# Patient Record
Sex: FEMALE | Race: WHITE | NOT HISPANIC OR LATINO
[De-identification: names, ages, dates, MRNs, and addresses within clinical notes are randomized per-mention and may not be internally consistent; named-entity substitution may affect disease eponyms.]

---

## 2017-03-09 ENCOUNTER — TRANSCRIPTION ENCOUNTER (OUTPATIENT)
Age: 32
End: 2017-03-09

## 2018-12-13 ENCOUNTER — TRANSCRIPTION ENCOUNTER (OUTPATIENT)
Age: 33
End: 2018-12-13

## 2018-12-31 ENCOUNTER — TRANSCRIPTION ENCOUNTER (OUTPATIENT)
Age: 33
End: 2018-12-31

## 2020-10-01 ENCOUNTER — APPOINTMENT (OUTPATIENT)
Dept: DERMATOLOGY | Facility: CLINIC | Age: 35
End: 2020-10-01

## 2021-07-20 ENCOUNTER — NON-APPOINTMENT (OUTPATIENT)
Age: 36
End: 2021-07-20

## 2021-07-20 ENCOUNTER — APPOINTMENT (OUTPATIENT)
Dept: OBGYN | Facility: CLINIC | Age: 36
End: 2021-07-20
Payer: COMMERCIAL

## 2021-07-20 ENCOUNTER — TRANSCRIPTION ENCOUNTER (OUTPATIENT)
Age: 36
End: 2021-07-20

## 2021-07-20 VITALS
WEIGHT: 134 LBS | BODY MASS INDEX: 19.18 KG/M2 | HEIGHT: 70 IN | DIASTOLIC BLOOD PRESSURE: 81 MMHG | SYSTOLIC BLOOD PRESSURE: 128 MMHG

## 2021-07-20 DIAGNOSIS — Z00.00 ENCOUNTER FOR GENERAL ADULT MEDICAL EXAMINATION W/OUT ABNORMAL FINDINGS: ICD-10-CM

## 2021-07-20 PROCEDURE — 99385 PREV VISIT NEW AGE 18-39: CPT

## 2021-07-20 PROCEDURE — 99072 ADDL SUPL MATRL&STAF TM PHE: CPT

## 2021-07-20 NOTE — HISTORY OF PRESENT ILLNESS
[FreeTextEntry1] : 35 yo p1 here for annual exam. has 8 mo old son, had nsd at 36 weeks , uncomplicated.had multiple problems w breast feeding- clogged ducts, mastitis, cyst drainages, so stopped after a month. works for [email or web address].

## 2021-07-22 LAB — HPV HIGH+LOW RISK DNA PNL CVX: NOT DETECTED

## 2021-07-28 LAB — CYTOLOGY CVX/VAG DOC THIN PREP: NORMAL

## 2022-04-01 ENCOUNTER — TRANSCRIPTION ENCOUNTER (OUTPATIENT)
Age: 37
End: 2022-04-01

## 2022-04-05 RX ORDER — LEVONORGESTREL AND ETHINYL ESTRADIOL 0.1-0.02MG
0.1-2 KIT ORAL DAILY
Qty: 3 | Refills: 0 | Status: ACTIVE | COMMUNITY
Start: 2021-09-29 | End: 1900-01-01

## 2022-04-22 ENCOUNTER — APPOINTMENT (OUTPATIENT)
Dept: ORTHOPEDIC SURGERY | Facility: CLINIC | Age: 37
End: 2022-04-22
Payer: COMMERCIAL

## 2022-04-22 VITALS
SYSTOLIC BLOOD PRESSURE: 127 MMHG | HEIGHT: 70 IN | BODY MASS INDEX: 19.33 KG/M2 | WEIGHT: 135 LBS | HEART RATE: 85 BPM | DIASTOLIC BLOOD PRESSURE: 81 MMHG

## 2022-04-22 DIAGNOSIS — Z78.9 OTHER SPECIFIED HEALTH STATUS: ICD-10-CM

## 2022-04-22 PROCEDURE — 99203 OFFICE O/P NEW LOW 30 MIN: CPT

## 2022-04-22 RX ORDER — METHYLPREDNISOLONE 4 MG/1
4 TABLET ORAL
Qty: 1 | Refills: 0 | Status: ACTIVE | COMMUNITY
Start: 2022-04-22 | End: 1900-01-01

## 2022-04-22 NOTE — CONSULT LETTER
[Dear  ___] : Dear  [unfilled], [Consult Letter:] : I had the pleasure of evaluating your patient, [unfilled]. [( Thank you for referring [unfilled] for consultation for _____ )] : Thank you for referring [unfilled] for consultation for [unfilled] [Please see my note below.] : Please see my note below. [Consult Closing:] : Thank you very much for allowing me to participate in the care of this patient.  If you have any questions, please do not hesitate to contact me. [Sincerely,] : Sincerely, [FreeTextEntry2] : EARLE FRANKLIN DO\par  [FreeTextEntry3] : Humberto Kessler DO.\par Sports Medicine \par Orthopaedic Surgery\par \par [facility]\par [facility] \par [address] \par [address] \[place] [address]\par \par Tel [phone]\par Fax [phone]\par \par For same day and next day orthopedic appointments contact:\par [email or web address] |[phone])\par Appointments available nights and weekends!  \par \par [facility]\par Visit us at [facility]/orthopaedic\par

## 2022-04-22 NOTE — PHYSICAL EXAM
[de-identified] : General:\par Awake, alert, no acute distress, Patient was cooperative and appropriate during the examination.\par \par The patient's weight is of normal weight for height and age.\par \par Patient ambulates with a cane because of severe left hip pain.\par \par Full, painless range of motion of the neck and back.\par \par Exam of the bilateral lower extremities is intact and symmetric with regards to dermatologic, vascular, and neurologic exam. Bilateral lower extremity sensation is grossly intact to light touch in the DP/SP/T/S/S nerve distributions. Intact DF/PF/EHL. BIlateral lower extremity warm and well-perfused with brisk capillary refill.\par \par Pulmonary:\par Regular, nonlabored breathing\par \par Abdomen:\par Soft, nontender, nondistended.\par \par Lymphatic:\par No evidence of inguinal lymphadenopathy\par \par \par \par Left hip Examination:\par Physical examination of the hip demonstrates normal skin without signs of skin changes or abnormalities. No erythema, warmth, or joint effusion is appreciated.\par \par Sensation is intact to light touch L2-S1\par Palpable DP/PT pulse\par EHL/FHL/TA/GSC motor function intact\par \par Range of Motion\par 90 degrees of flexion to full extension limited by pain\par 30 degrees of internal rotation limited by pain\par 60 degrees of external rotation\par \par Strength Testing\par Hip Flexors/Hip Extensors 5/5\par Hip Abductors/Hip Adductors 5/5\par Quadriceps/Hamstring 5/5\par Patient is able to perform a straight leg raise without difficulty.\par \par Palpation\par Mildly tender to palpation about the greater trochanter\par Moderately tender to palpation about the hip joint\par Not tender to palpation about the pubic symphysis\par Not tender to palpation about the iliac crest, sacrum, or lumbar spine\par \par Special Tests\par ROSSANA test positive for pain\par FADIR test positive for severe pain about the groin and hip\par [place] test negative\par Straight leg raise test negative for radicular pain\par \par  [de-identified] : X-rays including multiple views of the lumbar spine, pelvis, and left hip from [facility] radiology reviewed the patient in the office.  There is no acute fracture or dislocation.  There is no significant arthritis.  The patient has mild lumbar scoliosis.

## 2022-04-22 NOTE — HISTORY OF PRESENT ILLNESS
[Pain Location] : pain [] : left hip [Worsening] : worsening [Walking] : walking [Sitting] : sitting [Standing] : standing [Intermit.] : ~He/She~ states the symptoms seem to be intermittent [Bending] : worsened by bending [Direct Pressure] : worsened by direct pressure [Hip Movement] : worsened by hip movement [Rest] : relieved by rest [de-identified] : 4/22/2022: Rubina is a pleasant 36-year-old female who presents to my office today complaining of severe left hip pain which began on 4/1/2022.  She denies any history of trauma that she can recall.  Her pain is activity related and worse with weightbearing.  Pain is alleviated by rest.  She has never had pain like this before.  She currently requires a cane to assist her with ambulation.  She saw her primary care physician who ordered x-rays of her lumbar spine and hip which were negative for any fracture.  She does have mild scoliosis of her lumbar spine.  She denies any radicular pain or numbness or tingling in the lower extremities.  She denies any loss of bowel or bladder control.  She does feel a sense of weakness because of the pain in her hip and groin area.  She denies any fevers, chills, sweats, or pain elsewhere.

## 2022-04-22 NOTE — REVIEW OF SYSTEMS
[Joint Pain] : joint pain [Joint Stiffness] : joint stiffness [Negative] : Heme/Lymph [FreeTextEntry9] : Severe left hip pain

## 2022-04-22 NOTE — DISCUSSION/SUMMARY
[de-identified] : Assessment: 36-year-old female with severe left hip pain\par \par Plan: I had a long discussion with the patient today regarding the nature of their diagnosis and treatment plan. We discussed the risks and benefits of no treatment as well as nonoperative and operative treatments.  I reviewed the patient's x-rays today with her in the office which are negative for any acute pathology.  On examination she has significant pain about the groin and hip in the absence of any trauma.  She requires a cane to ambulate right now and her pain is gotten progressively worse over the past couple of weeks.  At this point recommend an MRI to rule out any internal derangement including an acetabular labral tear.  She may continue to treat herself symptomatically on her own at home.  Prescriptions for Medrol Dosepak as well as meloxicam was sent to her pharmacy today.  I reviewed the risk and benefit associated medications.  Recommend follow-up after MRI to discuss results in person and any further recommendations.\par \par The patient verbalizes their understanding and agrees with the plan.  All questions were answered to their satisfaction.

## 2022-05-02 ENCOUNTER — APPOINTMENT (OUTPATIENT)
Dept: MRI IMAGING | Facility: CLINIC | Age: 37
End: 2022-05-02
Payer: SELF-PAY

## 2022-05-02 ENCOUNTER — OUTPATIENT (OUTPATIENT)
Dept: OUTPATIENT SERVICES | Facility: HOSPITAL | Age: 37
LOS: 1 days | End: 2022-05-02
Payer: SELF-PAY

## 2022-05-02 ENCOUNTER — APPOINTMENT (OUTPATIENT)
Dept: ORTHOPEDIC SURGERY | Facility: CLINIC | Age: 37
End: 2022-05-02
Payer: COMMERCIAL

## 2022-05-02 VITALS — BODY MASS INDEX: 19.33 KG/M2 | HEIGHT: 70 IN | WEIGHT: 135 LBS

## 2022-05-02 DIAGNOSIS — M25.552 PAIN IN LEFT HIP: ICD-10-CM

## 2022-05-02 PROCEDURE — 99214 OFFICE O/P EST MOD 30 MIN: CPT

## 2022-05-02 PROCEDURE — 73721 MRI JNT OF LWR EXTRE W/O DYE: CPT | Mod: 26,LT

## 2022-05-02 PROCEDURE — 73721 MRI JNT OF LWR EXTRE W/O DYE: CPT

## 2022-05-02 NOTE — DISCUSSION/SUMMARY
[de-identified] : Assessment: 36-year-old female with a left sided femoral neck stress fracture\par \par Plan: I had a long discussion with the patient today regarding the nature of their diagnosis and treatment plan.  I reviewed the patient's MRI today with her and her father-in-law in the office which demonstrates a compression sided left femoral neck stress fracture which extends approximately 75% of the width of the femoral neck.  We discussed the risks and benefits of no treatment as well as nonoperative and operative treatments.  Nonsurgical treatment would include a minimum of 6-8 weeks nonweightbearing on crutches for her left lower extremity.  The benefits of nonsurgical treatment are that they avoid the risks and recovery associated with the surgery.  The risks of nonsurgical treatment include fracture propagation and displacement.  There is also a possibility the stress fracture may not heal and she may require surgery after this period of immobilization anyways.  Surgical treatment would include a closed reduction and percutaneous pinning of a left femoral neck stress fracture.  The benefits of surgery include prevention of fracture displacement as well as accelerated femoral neck healing and immediate postoperative weightbearing.  The risks of surgery include but not limited to infection, blood loss, blood clots, neurovascular injury, stiffness/loss of range of motion, persistent pain, progressive arthritis, fracture propagation and displacement, instability, malunion, nonunion, painful hardware, anesthesia related complications including paralysis and death, and the need for further surgery in the future.  Postoperatively the patient would be allowed to bear weight as tolerated on her left lower extremity with assistive devices as needed.  Physical therapy will be recommended 2 to 3 days a week for 6 to 8 weeks starting 2 weeks after surgery in order to optimize her postoperative function.  Noncompliance with any postoperative restrictions and/or failure to participate in physical therapy could lead to a suboptimal outcome or failure.  The patient verbalizes her understanding of all surgical risks as well as anticipated postoperative course and would like to proceed with surgery.  She will speak with my surgical coordinator regarding scheduling the procedure in the next couple of days.  She will remain completely nonweightbearing of her left lower extremity until the surgery.  She was provided with crutches in the office today\par \par The patient verbalizes their understanding and agrees with the plan.  All questions were answered to their satisfaction.

## 2022-05-02 NOTE — PHYSICAL EXAM
[de-identified] : General:\par Awake, alert, no acute distress, Patient was cooperative and appropriate during the examination.\par \par The patient's weight is of normal weight for height and age.\par \par Patient ambulates with a cane because of severe left hip pain.\par \par Full, painless range of motion of the neck and back.\par \par Exam of the bilateral lower extremities is intact and symmetric with regards to dermatologic, vascular, and neurologic exam. Bilateral lower extremity sensation is grossly intact to light touch in the DP/SP/T/S/S nerve distributions. Intact DF/PF/EHL. BIlateral lower extremity warm and well-perfused with brisk capillary refill.\par \par Pulmonary:\par Regular, nonlabored breathing\par \par Abdomen:\par Soft, nontender, nondistended.\par \par Lymphatic:\par No evidence of inguinal lymphadenopathy\par \par \par \par Left hip Examination:\par Physical examination of the hip demonstrates normal skin without signs of skin changes or abnormalities. No erythema, warmth, or joint effusion is appreciated.\par \par Sensation is intact to light touch L2-S1\par Palpable DP/PT pulse\par EHL/FHL/TA/GSC motor function intact\par \par Range of Motion\par 90 degrees of flexion to full extension limited by pain\par 30 degrees of internal rotation limited by pain\par 60 degrees of external rotation\par \par Strength Testing\par Hip Flexors/Hip Extensors 5/5\par Hip Abductors/Hip Adductors 5/5\par Quadriceps/Hamstring 5/5\par Patient is able to perform a straight leg raise without difficulty.\par \par Palpation\par Mildly tender to palpation about the greater trochanter\par Moderately tender to palpation about the hip joint\par Not tender to palpation about the pubic symphysis\par Not tender to palpation about the iliac crest, sacrum, or lumbar spine\par \par Special Tests\par ROSSANA test positive for pain\par FADIR test positive for severe pain about the groin and hip\par [place] test negative\par Straight leg raise test negative for radicular pain\par \par  [de-identified] : MRI of the left hip from a [facility] imaging facility on 5/2/2022 was reviewed the patient today in the office.  The patient has a high-grade incomplete basicervical femoral neck fracture with surrounding edema sparing the superolateral cortex.  There is an anterosuperior labral tear.  This is a compression sided stress fracture which extends more than 50% of the width of the femoral neck.\par \par X-rays including multiple views of the lumbar spine, pelvis, and left hip from [facility] radiology reviewed the patient in the office.  There is no acute fracture or dislocation.  There is no significant arthritis.  The patient has mild lumbar scoliosis.

## 2022-05-02 NOTE — HISTORY OF PRESENT ILLNESS
[de-identified] : 5/2/2022: Rubina is a pleasant 36-year-old female who returns to my office today for follow-up of her severe left hip pain which began on 4/1/2022.  She continues to ambulate with a cane and has significant pain.  She recently had an MRI of the hip which demonstrates a femoral neck stress fracture.  The MRI was performed this morning and the radiologist called me.  I spoke with the patient and had her come to my office immediately and she has not put weight on it since MRI findings.  The patient denies any fevers, chills, sweats, recent illnesses, numbness, tingling, weakness, or pain elsewhere at this time.\par \par 4/22/2022: Rubina is a pleasant 36-year-old female who presents to my office today complaining of severe left hip pain which began on 4/1/2022.  She denies any history of trauma that she can recall.  Her pain is activity related and worse with weightbearing.  Pain is alleviated by rest.  She has never had pain like this before.  She currently requires a cane to assist her with ambulation.  She saw her primary care physician who ordered x-rays of her lumbar spine and hip which were negative for any fracture.  She does have mild scoliosis of her lumbar spine.  She denies any radicular pain or numbness or tingling in the lower extremities.  She denies any loss of bowel or bladder control.  She does feel a sense of weakness because of the pain in her hip and groin area.  She denies any fevers, chills, sweats, or pain elsewhere.

## 2022-05-03 ENCOUNTER — OUTPATIENT (OUTPATIENT)
Dept: OUTPATIENT SERVICES | Facility: HOSPITAL | Age: 37
LOS: 1 days | End: 2022-05-03
Payer: COMMERCIAL

## 2022-05-03 VITALS
OXYGEN SATURATION: 97 % | HEIGHT: 70 IN | RESPIRATION RATE: 20 BRPM | DIASTOLIC BLOOD PRESSURE: 67 MMHG | HEART RATE: 90 BPM | WEIGHT: 134.48 LBS | SYSTOLIC BLOOD PRESSURE: 113 MMHG | TEMPERATURE: 98 F

## 2022-05-03 DIAGNOSIS — Z29.9 ENCOUNTER FOR PROPHYLACTIC MEASURES, UNSPECIFIED: ICD-10-CM

## 2022-05-03 DIAGNOSIS — Z01.818 ENCOUNTER FOR OTHER PREPROCEDURAL EXAMINATION: ICD-10-CM

## 2022-05-03 DIAGNOSIS — K08.409 PARTIAL LOSS OF TEETH, UNSPECIFIED CAUSE, UNSPECIFIED CLASS: Chronic | ICD-10-CM

## 2022-05-03 DIAGNOSIS — M84.352A STRESS FRACTURE, LEFT FEMUR, INITIAL ENCOUNTER FOR FRACTURE: ICD-10-CM

## 2022-05-03 DIAGNOSIS — M25.552 PAIN IN LEFT HIP: ICD-10-CM

## 2022-05-03 LAB
ANION GAP SERPL CALC-SCNC: 13 MMOL/L — SIGNIFICANT CHANGE UP (ref 5–17)
APTT BLD: 26.5 SEC — LOW (ref 27.5–35.5)
BASOPHILS # BLD AUTO: 0.07 K/UL — SIGNIFICANT CHANGE UP (ref 0–0.2)
BASOPHILS NFR BLD AUTO: 0.9 % — SIGNIFICANT CHANGE UP (ref 0–2)
BLD GP AB SCN SERPL QL: SIGNIFICANT CHANGE UP
BUN SERPL-MCNC: 12.9 MG/DL — SIGNIFICANT CHANGE UP (ref 8–20)
CALCIUM SERPL-MCNC: 9.2 MG/DL — SIGNIFICANT CHANGE UP (ref 8.6–10.2)
CHLORIDE SERPL-SCNC: 100 MMOL/L — SIGNIFICANT CHANGE UP (ref 98–107)
CO2 SERPL-SCNC: 25 MMOL/L — SIGNIFICANT CHANGE UP (ref 22–29)
CREAT SERPL-MCNC: 0.62 MG/DL — SIGNIFICANT CHANGE UP (ref 0.5–1.3)
EGFR: 117 ML/MIN/1.73M2 — SIGNIFICANT CHANGE UP
EOSINOPHIL # BLD AUTO: 0.08 K/UL — SIGNIFICANT CHANGE UP (ref 0–0.5)
EOSINOPHIL NFR BLD AUTO: 1 % — SIGNIFICANT CHANGE UP (ref 0–6)
GLUCOSE SERPL-MCNC: 84 MG/DL — SIGNIFICANT CHANGE UP (ref 70–99)
HCG SERPL-ACNC: <4 MIU/ML — SIGNIFICANT CHANGE UP
HCT VFR BLD CALC: 42.9 % — SIGNIFICANT CHANGE UP (ref 34.5–45)
HGB BLD-MCNC: 14.3 G/DL — SIGNIFICANT CHANGE UP (ref 11.5–15.5)
IMM GRANULOCYTES NFR BLD AUTO: 0.5 % — SIGNIFICANT CHANGE UP (ref 0–1.5)
INR BLD: 0.99 RATIO — SIGNIFICANT CHANGE UP (ref 0.88–1.16)
LYMPHOCYTES # BLD AUTO: 2.14 K/UL — SIGNIFICANT CHANGE UP (ref 1–3.3)
LYMPHOCYTES # BLD AUTO: 28 % — SIGNIFICANT CHANGE UP (ref 13–44)
MCHC RBC-ENTMCNC: 31.7 PG — SIGNIFICANT CHANGE UP (ref 27–34)
MCHC RBC-ENTMCNC: 33.3 GM/DL — SIGNIFICANT CHANGE UP (ref 32–36)
MCV RBC AUTO: 95.1 FL — SIGNIFICANT CHANGE UP (ref 80–100)
MONOCYTES # BLD AUTO: 0.48 K/UL — SIGNIFICANT CHANGE UP (ref 0–0.9)
MONOCYTES NFR BLD AUTO: 6.3 % — SIGNIFICANT CHANGE UP (ref 2–14)
NEUTROPHILS # BLD AUTO: 4.84 K/UL — SIGNIFICANT CHANGE UP (ref 1.8–7.4)
NEUTROPHILS NFR BLD AUTO: 63.3 % — SIGNIFICANT CHANGE UP (ref 43–77)
PLATELET # BLD AUTO: 260 K/UL — SIGNIFICANT CHANGE UP (ref 150–400)
POTASSIUM SERPL-MCNC: 4.3 MMOL/L — SIGNIFICANT CHANGE UP (ref 3.5–5.3)
POTASSIUM SERPL-SCNC: 4.3 MMOL/L — SIGNIFICANT CHANGE UP (ref 3.5–5.3)
PROTHROM AB SERPL-ACNC: 11.5 SEC — SIGNIFICANT CHANGE UP (ref 10.5–13.4)
RBC # BLD: 4.51 M/UL — SIGNIFICANT CHANGE UP (ref 3.8–5.2)
RBC # FLD: 12.3 % — SIGNIFICANT CHANGE UP (ref 10.3–14.5)
SARS-COV-2 RNA SPEC QL NAA+PROBE: SIGNIFICANT CHANGE UP
SODIUM SERPL-SCNC: 138 MMOL/L — SIGNIFICANT CHANGE UP (ref 135–145)
WBC # BLD: 7.65 K/UL — SIGNIFICANT CHANGE UP (ref 3.8–10.5)
WBC # FLD AUTO: 7.65 K/UL — SIGNIFICANT CHANGE UP (ref 3.8–10.5)

## 2022-05-03 PROCEDURE — G0463: CPT

## 2022-05-03 RX ORDER — MELOXICAM 15 MG/1
0 TABLET ORAL
Qty: 0 | Refills: 0 | DISCHARGE

## 2022-05-03 NOTE — H&P PST ADULT - HISTORY OF PRESENT ILLNESS
Pt is a 36-year-old female seen today pre-op for Closed reduction and percutaneous pinning of left femoral neck stress fracture. Pt report  severe left hip pain which began on 4/1/2022. She continues to ambulate with a cane and has significant pain. She recently had an MRI of the hip which demonstrates a femoral neck stress fracture.  Pt is a 36-year-old female with no significant  PMH  seen today pre-op for Closed reduction and percutaneous pinning of left femoral neck stress fracture. Pt report severe left hip pain began on 4/1/2022, denies any trauma and fall, report pain is localized to left hip and  intermittent left hip stiffness. Pt report prior conservative treatment of steroid injection, physical therapy without any significant improvement with her pain. Pt states she continues to ambulate with a cane and has significant pain to left hip. Report  she was finally approved for MRI of the left hip which she had on 5/2/2022, MRI of the hip demonstrates a femoral neck stress fracture. Currently on non weight bearing status since 5/2/2022, requires the use of a wheelchair and crutches for ambulation. Seen today for a scheduled procedure on 5/5/2022 with Dr. Kessler

## 2022-05-03 NOTE — H&P PST ADULT - NSICDXFAMILYHX_GEN_ALL_CORE_FT
FAMILY HISTORY:  Mother  Still living? Unknown  Family hx osteoporosis, Age at diagnosis: Age Unknown    Grandparent  Still living? Unknown  Family hx osteoporosis, Age at diagnosis: Age Unknown

## 2022-05-03 NOTE — H&P PST ADULT - MUSCULOSKELETAL
details… left hip pain, pt unable to bear weight due to pain to site,,/decreased ROM/decreased ROM due to pain/joint swelling detailed exam

## 2022-05-03 NOTE — H&P PST ADULT - ASSESSMENT
Pt is a 36-year-old female with no significant  PMH  seen today pre-op for Closed reduction and percutaneous pinning of left femoral neck stress fracture. Pt report severe left hip pain began on 2022, denies any trauma and fall, report pain is localized to left hip and  intermittent left hip stiffness. Pt report prior conservative treatment of steroid injection, physical therapy without any significant improvement with her pain. Pt states she continues to ambulate with a cane and has significant pain to left hip. Report  she was finally approved for MRI of the left hip which she had on 2022, MRI of the hip demonstrates a femoral neck stress fracture. Currently on non weight bearing status since 2022, requires the use of a wheelchair and crutches for ambulation. Seen today for a scheduled procedure on 2022 with Dr. Kessler. Surgery protocol reviewed with Pt today.  CAPRINI VTE 2.0 SCORE [CLOT updated 2019]    AGE RELATED RISK FACTORS                                                       MOBILITY RELATED FACTORS  [ ] Age 41-60 years                                            (1 Point)                    [ ] Bed rest                                                        (1 Point)  [ ] Age: 61-74 years                                           (2 Points)                  [ ] Plaster cast                                                   (2 Points)  [ ] Age= 75 years                                              (3 Points)                    [ ] Bed bound for more than 72 hours                 (2 Points)    DISEASE RELATED RISK FACTORS                                               GENDER SPECIFIC FACTORS  [ ] Edema in the lower extremities                       (1 Point)              [ ] Pregnancy                                                     (1 Point)  [ ] Varicose veins                                               (1 Point)                     [ ] Post-partum < 6 weeks                                   (1 Point)             [x] BMI > 25 Kg/m2                                            (1 Point)                     [ ] Hormonal therapy  or oral contraception          (1 Point)                 [ ] Sepsis (in the previous month)                        (1 Point)               [ ] History of pregnancy complications                 (1 point)  [ ] Pneumonia or serious lung disease                                               [ ] Unexplained or recurrent                     (1 Point)           (in the previous month)                               (1 Point)  [ ] Abnormal pulmonary function test                     (1 Point)                 SURGERY RELATED RISK FACTORS  [ ] Acute myocardial infarction                              (1 Point)               [ ]  Section                                             (1 Point)  [ ] Congestive heart failure (in the previous month)  (1 Point)      [ ] Minor surgery                                                  (1 Point)   [ ] Inflammatory bowel disease                             (1 Point)               [ ] Arthroscopic surgery                                        (2 Points)  [ ] Central venous access                                      (2 Points)                [x] General surgery lasting more than 45 minutes (2 points)  [ ] Malignancy- Present or previous                   (2 Points)                [ ] Elective arthroplasty                                         (5 points)    [ ] Stroke (in the previous month)                          (5 Points)                                                                                                                                                           HEMATOLOGY RELATED FACTORS                                                 TRAUMA RELATED RISK FACTORS  [ ] Prior episodes of VTE                                     (3 Points)                [ ] Fracture of the hip, pelvis, or leg                       (5 Points)  [ ] Positive family history for VTE                         (3 Points)             [ ] Acute spinal cord injury (in the previous month)  (5 Points)  [ ] Prothrombin 49746 A                                     (3 Points)               [ ] Paralysis  (less than 1 month)                             (5 Points)  [ ] Factor V Leiden                                             (3 Points)                  [ ] Multiple Trauma within 1 month                        (5 Points)  [ ] Lupus anticoagulants                                     (3 Points)                                                           [ ] Anticardiolipin antibodies                               (3 Points)                                                       [ ] High homocysteine in the blood                      (3 Points)                                             [ ] Other congenital or acquired thrombophilia      (3 Points)                                                [ ] Heparin induced thrombocytopenia                  (3 Points)                                     Total Score [    3      ]  OPIOID RISK TOOL    RICARDO EACH BOX THAT APPLIES AND ADD TOTALS AT THE END    FAMILY HISTORY OF SUBSTANCE ABUSE                 FEMALE         MALE                                                Alcohol                             [  ]1 pt          [  ]3pts                                               Illegal Durgs                     [  ]2 pts        [  ]3pts                                               Rx Drugs                           [  ]4 pts        [  ]4 pts    PERSONAL HISTORY OF SUBSTANCE ABUSE                                                                                          Alcohol                             [  ]3 pts       [  ]3 pts                                               Illegal Drugs                     [  ]4 pts        [  ]4 pts                                               Rx Drugs                           [  ]5 pts        [  ]5 pts    AGE BETWEEN 16-45 YEARS                                      [  ]1 pt         [  ]1 pt    HISTORY OF PREADOLESCENT   SEXUAL ABUSE                                                             [  ]3 pts        [  ]0pts    PSYCHOLOGICAL DISEASE                     ADD, OCD, Bipolar, Schizophrenia        [  ]2 pts         [  ]2 pts                      Depression                                               [  ]1 pt           [  ]1 pt           SCORING TOTAL   (add numbers and type here)              (**0*)                                     A score of 3 or lower indicated LOW risk for future opioid abuse  A score of 4 to 7 indicated moderate risk for future opioid abuse  A score of 8 or higher indicates a high risk for opioid abuse

## 2022-05-03 NOTE — H&P PST ADULT - NSICDXPASTMEDICALHX_GEN_ALL_CORE_FT
PAST MEDICAL HISTORY:  Left hip pain      PAST MEDICAL HISTORY:  Left hip pain     Stress fracture left hip

## 2022-05-05 ENCOUNTER — APPOINTMENT (OUTPATIENT)
Dept: ORTHOPEDIC SURGERY | Facility: HOSPITAL | Age: 37
End: 2022-05-05

## 2022-05-05 ENCOUNTER — OUTPATIENT (OUTPATIENT)
Dept: INPATIENT UNIT | Facility: HOSPITAL | Age: 37
LOS: 1 days | End: 2022-05-05
Payer: COMMERCIAL

## 2022-05-05 ENCOUNTER — TRANSCRIPTION ENCOUNTER (OUTPATIENT)
Age: 37
End: 2022-05-05

## 2022-05-05 VITALS
SYSTOLIC BLOOD PRESSURE: 120 MMHG | RESPIRATION RATE: 15 BRPM | HEART RATE: 90 BPM | TEMPERATURE: 98 F | WEIGHT: 134.48 LBS | OXYGEN SATURATION: 100 % | DIASTOLIC BLOOD PRESSURE: 70 MMHG | HEIGHT: 70 IN

## 2022-05-05 VITALS — HEART RATE: 72 BPM | RESPIRATION RATE: 11 BRPM | TEMPERATURE: 97 F | OXYGEN SATURATION: 100 %

## 2022-05-05 DIAGNOSIS — M25.552 PAIN IN LEFT HIP: ICD-10-CM

## 2022-05-05 DIAGNOSIS — K08.409 PARTIAL LOSS OF TEETH, UNSPECIFIED CAUSE, UNSPECIFIED CLASS: Chronic | ICD-10-CM

## 2022-05-05 PROCEDURE — C1713: CPT

## 2022-05-05 PROCEDURE — 76000 FLUOROSCOPY <1 HR PHYS/QHP: CPT

## 2022-05-05 PROCEDURE — 27235 TREAT THIGH FRACTURE: CPT | Mod: LT

## 2022-05-05 DEVICE — IMPLANTABLE DEVICE: Type: IMPLANTABLE DEVICE | Status: FUNCTIONAL

## 2022-05-05 DEVICE — GWIRE ASNIS III THREADED 3.2X300MM: Type: IMPLANTABLE DEVICE | Status: FUNCTIONAL

## 2022-05-05 RX ORDER — OXYCODONE 5 MG/1
5 TABLET ORAL
Qty: 30 | Refills: 0 | Status: ACTIVE | COMMUNITY
Start: 2022-05-05 | End: 1900-01-01

## 2022-05-05 RX ORDER — SODIUM CHLORIDE 9 MG/ML
1000 INJECTION, SOLUTION INTRAVENOUS
Refills: 0 | Status: DISCONTINUED | OUTPATIENT
Start: 2022-05-05 | End: 2022-05-19

## 2022-05-05 RX ORDER — SODIUM CHLORIDE 9 MG/ML
3 INJECTION INTRAMUSCULAR; INTRAVENOUS; SUBCUTANEOUS ONCE
Refills: 0 | Status: DISCONTINUED | OUTPATIENT
Start: 2022-05-05 | End: 2022-05-05

## 2022-05-05 RX ORDER — OXYCODONE AND ACETAMINOPHEN 5; 325 MG/1; MG/1
1 TABLET ORAL EVERY 4 HOURS
Refills: 0 | Status: DISCONTINUED | OUTPATIENT
Start: 2022-05-05 | End: 2022-05-05

## 2022-05-05 RX ORDER — CEFAZOLIN SODIUM 1 G
2000 VIAL (EA) INJECTION ONCE
Refills: 0 | Status: DISCONTINUED | OUTPATIENT
Start: 2022-05-05 | End: 2022-05-05

## 2022-05-05 RX ORDER — ACETAMINOPHEN 500 MG
975 TABLET ORAL ONCE
Refills: 0 | Status: COMPLETED | OUTPATIENT
Start: 2022-05-05 | End: 2022-05-05

## 2022-05-05 RX ORDER — FENTANYL CITRATE 50 UG/ML
50 INJECTION INTRAVENOUS
Refills: 0 | Status: DISCONTINUED | OUTPATIENT
Start: 2022-05-05 | End: 2022-05-05

## 2022-05-05 RX ORDER — ONDANSETRON 8 MG/1
4 TABLET, FILM COATED ORAL ONCE
Refills: 0 | Status: DISCONTINUED | OUTPATIENT
Start: 2022-05-05 | End: 2022-05-05

## 2022-05-05 RX ORDER — SODIUM CHLORIDE 9 MG/ML
1000 INJECTION, SOLUTION INTRAVENOUS
Refills: 0 | Status: DISCONTINUED | OUTPATIENT
Start: 2022-05-05 | End: 2022-05-05

## 2022-05-05 RX ADMIN — Medication 975 MILLIGRAM(S): at 06:42

## 2022-05-05 RX ADMIN — FENTANYL CITRATE 50 MICROGRAM(S): 50 INJECTION INTRAVENOUS at 10:00

## 2022-05-05 RX ADMIN — OXYCODONE AND ACETAMINOPHEN 1 TABLET(S): 5; 325 TABLET ORAL at 10:40

## 2022-05-05 RX ADMIN — FENTANYL CITRATE 50 MICROGRAM(S): 50 INJECTION INTRAVENOUS at 09:30

## 2022-05-05 RX ADMIN — OXYCODONE AND ACETAMINOPHEN 1 TABLET(S): 5; 325 TABLET ORAL at 10:10

## 2022-05-05 NOTE — ASU DISCHARGE PLAN (ADULT/PEDIATRIC) - CARE PROVIDER_API CALL
Humberto Kessler (DO)  Orthopedics  [address] [place] [address]  Phone: [phone]  Fax: [phone]  Follow Up Time:

## 2022-05-05 NOTE — ASU DISCHARGE PLAN (ADULT/PEDIATRIC) - NS MD DC FALL RISK RISK
For information on Fall & Injury Prevention, visit: [email or web address] OR  [email or web address] OR  https://www.cdc.gov/steadi/patient.html

## 2022-05-05 NOTE — BRIEF OPERATIVE NOTE - NSICDXBRIEFPROCEDURE_GEN_ALL_CORE_FT
PROCEDURES:  Closed reduction and percutaneous pinning (CRPP) of fracture of neck of femur 05-May-2022 08:55:46  Humberto Kessler

## 2022-05-12 ENCOUNTER — NON-APPOINTMENT (OUTPATIENT)
Age: 37
End: 2022-05-12

## 2022-05-17 ENCOUNTER — RX RENEWAL (OUTPATIENT)
Age: 37
End: 2022-05-17

## 2022-05-17 RX ORDER — MELOXICAM 15 MG/1
15 TABLET ORAL
Qty: 21 | Refills: 0 | Status: ACTIVE | COMMUNITY
Start: 2022-04-29 | End: 1900-01-01

## 2022-05-19 ENCOUNTER — APPOINTMENT (OUTPATIENT)
Dept: ULTRASOUND IMAGING | Facility: CLINIC | Age: 37
End: 2022-05-19
Payer: COMMERCIAL

## 2022-05-19 ENCOUNTER — APPOINTMENT (OUTPATIENT)
Dept: ORTHOPEDIC SURGERY | Facility: CLINIC | Age: 37
End: 2022-05-19
Payer: COMMERCIAL

## 2022-05-19 ENCOUNTER — EMERGENCY (EMERGENCY)
Facility: HOSPITAL | Age: 37
LOS: 1 days | Discharge: HOME | End: 2022-05-19
Attending: EMERGENCY MEDICINE
Payer: COMMERCIAL

## 2022-05-19 ENCOUNTER — OUTPATIENT (OUTPATIENT)
Dept: OUTPATIENT SERVICES | Facility: HOSPITAL | Age: 37
LOS: 1 days | End: 2022-05-19
Payer: COMMERCIAL

## 2022-05-19 VITALS
OXYGEN SATURATION: 100 % | WEIGHT: 134.92 LBS | HEART RATE: 97 BPM | TEMPERATURE: 99 F | HEIGHT: 70 IN | SYSTOLIC BLOOD PRESSURE: 107 MMHG | DIASTOLIC BLOOD PRESSURE: 58 MMHG | RESPIRATION RATE: 18 BRPM

## 2022-05-19 DIAGNOSIS — M25.552 PAIN IN LEFT HIP: ICD-10-CM

## 2022-05-19 DIAGNOSIS — K08.409 PARTIAL LOSS OF TEETH, UNSPECIFIED CAUSE, UNSPECIFIED CLASS: Chronic | ICD-10-CM

## 2022-05-19 PROBLEM — M84.30XA STRESS FRACTURE, UNSPECIFIED SITE, INITIAL ENCOUNTER FOR FRACTURE: Chronic | Status: ACTIVE | Noted: 2022-05-03

## 2022-05-19 LAB
ALBUMIN SERPL ELPH-MCNC: 4.3 G/DL — SIGNIFICANT CHANGE UP (ref 3.3–5.2)
ALP SERPL-CCNC: 64 U/L — SIGNIFICANT CHANGE UP (ref 40–120)
ALT FLD-CCNC: 9 U/L — SIGNIFICANT CHANGE UP
ANION GAP SERPL CALC-SCNC: 14 MMOL/L — SIGNIFICANT CHANGE UP (ref 5–17)
APTT BLD: 27.3 SEC — LOW (ref 27.5–35.5)
AST SERPL-CCNC: 15 U/L — SIGNIFICANT CHANGE UP
BASOPHILS # BLD AUTO: 0.03 K/UL — SIGNIFICANT CHANGE UP (ref 0–0.2)
BASOPHILS NFR BLD AUTO: 0.4 % — SIGNIFICANT CHANGE UP (ref 0–2)
BILIRUB SERPL-MCNC: 0.2 MG/DL — LOW (ref 0.4–2)
BUN SERPL-MCNC: 10.1 MG/DL — SIGNIFICANT CHANGE UP (ref 8–20)
CALCIUM SERPL-MCNC: 9.1 MG/DL — SIGNIFICANT CHANGE UP (ref 8.6–10.2)
CHLORIDE SERPL-SCNC: 102 MMOL/L — SIGNIFICANT CHANGE UP (ref 98–107)
CO2 SERPL-SCNC: 23 MMOL/L — SIGNIFICANT CHANGE UP (ref 22–29)
CREAT SERPL-MCNC: 0.76 MG/DL — SIGNIFICANT CHANGE UP (ref 0.5–1.3)
EGFR: 103 ML/MIN/1.73M2 — SIGNIFICANT CHANGE UP
EOSINOPHIL # BLD AUTO: 0.06 K/UL — SIGNIFICANT CHANGE UP (ref 0–0.5)
EOSINOPHIL NFR BLD AUTO: 0.8 % — SIGNIFICANT CHANGE UP (ref 0–6)
GLUCOSE SERPL-MCNC: 107 MG/DL — HIGH (ref 70–99)
HCG SERPL-ACNC: <4 MIU/ML — SIGNIFICANT CHANGE UP
HCT VFR BLD CALC: 39.6 % — SIGNIFICANT CHANGE UP (ref 34.5–45)
HGB BLD-MCNC: 13 G/DL — SIGNIFICANT CHANGE UP (ref 11.5–15.5)
IMM GRANULOCYTES NFR BLD AUTO: 0.1 % — SIGNIFICANT CHANGE UP (ref 0–1.5)
INR BLD: 0.96 RATIO — SIGNIFICANT CHANGE UP (ref 0.88–1.16)
LYMPHOCYTES # BLD AUTO: 1.99 K/UL — SIGNIFICANT CHANGE UP (ref 1–3.3)
LYMPHOCYTES # BLD AUTO: 26.4 % — SIGNIFICANT CHANGE UP (ref 13–44)
MCHC RBC-ENTMCNC: 31 PG — SIGNIFICANT CHANGE UP (ref 27–34)
MCHC RBC-ENTMCNC: 32.8 GM/DL — SIGNIFICANT CHANGE UP (ref 32–36)
MCV RBC AUTO: 94.5 FL — SIGNIFICANT CHANGE UP (ref 80–100)
MONOCYTES # BLD AUTO: 0.51 K/UL — SIGNIFICANT CHANGE UP (ref 0–0.9)
MONOCYTES NFR BLD AUTO: 6.8 % — SIGNIFICANT CHANGE UP (ref 2–14)
NEUTROPHILS # BLD AUTO: 4.93 K/UL — SIGNIFICANT CHANGE UP (ref 1.8–7.4)
NEUTROPHILS NFR BLD AUTO: 65.5 % — SIGNIFICANT CHANGE UP (ref 43–77)
PLATELET # BLD AUTO: 286 K/UL — SIGNIFICANT CHANGE UP (ref 150–400)
POTASSIUM SERPL-MCNC: 4.2 MMOL/L — SIGNIFICANT CHANGE UP (ref 3.5–5.3)
POTASSIUM SERPL-SCNC: 4.2 MMOL/L — SIGNIFICANT CHANGE UP (ref 3.5–5.3)
PROT SERPL-MCNC: 7 G/DL — SIGNIFICANT CHANGE UP (ref 6.6–8.7)
PROTHROM AB SERPL-ACNC: 11.1 SEC — SIGNIFICANT CHANGE UP (ref 10.5–13.4)
RBC # BLD: 4.19 M/UL — SIGNIFICANT CHANGE UP (ref 3.8–5.2)
RBC # FLD: 12.7 % — SIGNIFICANT CHANGE UP (ref 10.3–14.5)
SODIUM SERPL-SCNC: 139 MMOL/L — SIGNIFICANT CHANGE UP (ref 135–145)
WBC # BLD: 7.53 K/UL — SIGNIFICANT CHANGE UP (ref 3.8–10.5)
WBC # FLD AUTO: 7.53 K/UL — SIGNIFICANT CHANGE UP (ref 3.8–10.5)

## 2022-05-19 PROCEDURE — 93971 EXTREMITY STUDY: CPT

## 2022-05-19 PROCEDURE — 85730 THROMBOPLASTIN TIME PARTIAL: CPT

## 2022-05-19 PROCEDURE — 36415 COLL VENOUS BLD VENIPUNCTURE: CPT

## 2022-05-19 PROCEDURE — 73522 X-RAY EXAM HIPS BI 3-4 VIEWS: CPT

## 2022-05-19 PROCEDURE — 99284 EMERGENCY DEPT VISIT MOD MDM: CPT

## 2022-05-19 PROCEDURE — 80053 COMPREHEN METABOLIC PANEL: CPT

## 2022-05-19 PROCEDURE — 85610 PROTHROMBIN TIME: CPT

## 2022-05-19 PROCEDURE — 93971 EXTREMITY STUDY: CPT | Mod: 26

## 2022-05-19 PROCEDURE — 99283 EMERGENCY DEPT VISIT LOW MDM: CPT

## 2022-05-19 PROCEDURE — 84702 CHORIONIC GONADOTROPIN TEST: CPT

## 2022-05-19 PROCEDURE — 99024 POSTOP FOLLOW-UP VISIT: CPT

## 2022-05-19 PROCEDURE — 85025 COMPLETE CBC W/AUTO DIFF WBC: CPT

## 2022-05-19 RX ORDER — RIVAROXABAN 15 MG-20MG
1 KIT ORAL
Qty: 1 | Refills: 0
Start: 2022-05-19

## 2022-05-19 NOTE — ED STATDOCS - CARE PROVIDER_API CALL
ManvarSingh, Pallavi B (MD)  Vascular Surgery  [address] [facility], [address] [place] [address]  Phone: [phone]  Fax: [phone]  Follow Up Time: 4-6 Days

## 2022-05-19 NOTE — HISTORY OF PRESENT ILLNESS
[Hip Pain] : Hip Pain [___ Weeks Post Op] : [unfilled] weeks post op [de-identified] : DOS: 05/05/2022\par s/p closed reduction and percutaneous pinning of a left femoral neck stress fracture. [de-identified] : DOS: 05/05/2022\aleksandr Jay is a 36-year-old female s/p closed reduction and percutaneous pinning of a left femoral neck stress fracture.  She states that since that surgery her pain is improving and she has been ambulating with 1 crutch.  She states she has been compliant with her postoperative restrictions.  She is here for routine follow-up today.  She denies any fevers, chills, chest pain, shortness of breath.  She denies any numbness or tingling distally. [de-identified] : On exam, the patient is pleasant.  They  are awake, alert, and oriented x3.  The patient presents today with crutches for assistance.\par Weight is appropriate for height\par Full range of motion of cervical spine without instability\par Full range of motion of back without instability\par Intact neurologic, vascular, and dermatologic exam to right and left upper extremities\par Intact neurologic, vascular, and dermatologic exam to right and left lower extremities.\par \par Left lower Extremity\par The patient's incisions are well approximated and healing well. No erythema, warmth, or drainage. There is mild postoperative swelling about the left hip.  No bony tenderness to palpation about the hip. Range of motion: Tolerates gentle range of motion in all planes.  Negative logroll test and can straight leg raise against gravity.  EHL/FHL/TA/GSC motor function is intact, sensations intact light touch in L2-S1 distributions, DP/PT pulses are palpable, compartments are soft and compressible.\par  [de-identified] : X-ray 3 views of the left hip and pelvis taken in the office today on 5/19/2022 shows hardware in good position with fracture alignment well-maintained and no new acute findings [de-identified] : 36-year-old female status post closed reduction and percutaneous pinning of a left femoral neck stress fracture on 5/5/2022 [de-identified] : Rubina is recovering well from her recent surgery.  She has had improvements in her pain, swelling, and range of motion since the day of surgery.  She may continue to bear weight as tolerated and wean off of her assistive devices as needed.  She will avoid any exercise or high impact activities at this time.  A referral for physical therapy was provided to begin working on range of motion and strengthening exercises for the lower extremity.  The patient is complaining of some calf pain so we will order an urgent ultrasound to rule out a DVT.  She has been on aspirin and will continue this for at least the next 2 weeks.  She will call us when the ultrasound is completed so that we may discuss the results.  Recommend follow-up in 4 weeks for repeat evaluation.  She verbalizes her understanding and agrees with the plan.  All questions were answered to her satisfaction.

## 2022-05-19 NOTE — ED STATDOCS - NS ED ATTENDING STATEMENT MOD
This was a shared visit with the MANDA. I reviewed and verified the documentation and independently performed the documented:

## 2022-05-19 NOTE — ED STATDOCS - PATIENT PORTAL LINK FT
You can access the FollowMyHealth Patient Portal offered by [facility] by registering at the following website: [email or web address]. By joining [facility]’s FollowMyHealth portal, you will also be able to view your health information using other applications (apps) compatible with our system.

## 2022-05-19 NOTE — ED STATDOCS - ATTENDING APP SHARED VISIT CONTRIBUTION OF CARE
I, Logan Galdamez, performed the initial face to face bedside interview with this patient regarding history of present illness, review of symptoms and relevant past medical, social and family history.  I completed an independent physical examination.  I was the initial provider who evaluated this patient. I have signed out the follow up of any pending tests (i.e. labs, radiological studies) to the MANDA.  I have communicated the patient’s plan of care and disposition with the MANDA.

## 2022-05-19 NOTE — ED STATDOCS - NS ED ROS FT
Review of Systems  •	CONSTITUTIONAL - no  fever, no diaphoresis, no weight change  •	SKIN - no rash  •	HEMATOLOGIC - no bleeding, no bruising  •	EYES - no eye pain, no blurred vision  •	ENT - no change in hearing, no pain  •	RESPIRATORY - no shortness of breath, no cough  •	CARDIAC - no chest pain, no palpitations  •	GI - no abd pain, no nausea, no vomiting, no diarrhea, no constipation, no bleeding  •	GENITO-URINARY - no discharge, no dysuria; no hematuria,   •	ENDO - no polydipsia, no polyuria, no heat/no cold intolerance  •	MUSCULOSKELETAL - no joint pain, no swelling, no redness, +left calf tenderness   •	NEUROLOGIC - no weakness, no headache, no anesthesia, no paresthesias  •	PSYCH - no anxiety, non suicidal, non homicidal, no hallucination, no depression

## 2022-05-19 NOTE — ED ADULT TRIAGE NOTE - NS ED TRIAGE AVPU SCALE
Alert-The patient is alert, awake and responds to voice. The patient is oriented to time, place, and person. The triage nurse is able to obtain subjective information. [id number]

## 2022-05-19 NOTE — ED STATDOCS - NSFOLLOWUPINSTRUCTIONS_ED_ALL_ED_FT
- Please follow up with your Primary Care Doctor in 1 - 2 days. If you cannot follow-up with your primary care doctor please return to the Emergency Department for any urgent issues.  - Seek immediate medical care for any new, worsening or concerning signs or symptoms.   - Take medications as directed, be sure to read all instructions on packaging  - You were given copies of all your test results, please bring to your primary care doctor for review of any abnormal test results  - Follow up with your primary doctor in 2 weeks for a repeat ultrasound  - Follow up with hematologist and vascular surgeon for blood clot     - If you have difficulty following up, please call: [phone] or go to [email or web address] to obtain a [facility] doctor or specialist who takes your insurance in your area.    Feel better!    Deep Vein Thrombosis       Deep vein thrombosis (DVT) is a condition in which a blood clot forms in a deep vein, such as a vein in the lower leg, thigh, pelvis, or arm. Deep veins are veins in the deep venous system. A clot is blood that has thickened into a gel or solid. This condition is serious and can be life-threatening if the clot travels to the lungs and causes a blockage (pulmonary embolism) in the arteries of the lung. A DVT can also damage veins in the leg. This can lead to long-term, or chronic, venous disease, leg pain, swelling, discoloration, and ulcers or sores (post-thrombotic syndrome).      What are the causes?    This condition may be caused by:  •A slowdown of blood flow.      •Damage to a vein.      •A condition that causes blood to clot more easily, such as certain blood-clotting disorders.        What increases the risk?    The following factors may make you more likely to develop this condition:  •Having obesity.      •Being older, especially older than age 60.    •Being inactive (sedentary lifestyle) or not moving around. This may include:  •Sitting or lying down for longer than 4–6 hours other than to sleep at night.      •Being in the hospital, having major or lengthy surgery, or having a thin, flexible tube (central line catheter) placed in a large vein.        •Being pregnant, giving birth, or having recently given birth.      •Taking medicines that contain estrogen, such as birth control or hormone replacement therapy.      •Using products that contain nicotine or tobacco, especially if you use hormonal birth control.      •Having a history of blood clots or a blood-clotting disease, a blood vessel disease (peripheral vascular disease), or congestive heart disease.      •Having a history of cancer, especially if being treated with chemotherapy.        What are the signs or symptoms?    Symptoms of this condition include:  •Swelling, pain, pressure, or tenderness in an arm or a leg.      •An arm or a leg becoming warm, red, or discolored.      •A leg turning very pale. You may have a large DVT. This is rare.      If the clot is in your leg, you may notice symptoms more or have worse symptoms when you stand or walk.    In some cases, there are no symptoms.      How is this diagnosed?    This condition is diagnosed with:  •Your medical history and a physical exam.    •Tests, such as:  •Blood tests to check how well your blood clots.      •Doppler ultrasound. This is the best way to find a DVT.      •Venogram. Contrast dye is injected into a vein, and X-rays are taken to check for clots.          How is this treated?    Treatment for this condition depends on:  •The cause of your DVT.      •The size and location of your DVT, or having more than one DVT.      •Your risk for bleeding or developing more clots.      •Other medical conditions you may have.      Treatment may include:  •Taking a blood thinner, also called an anticoagulant, to prevent clots from forming and growing.      •Wearing compression stockings, if directed.      •Injecting medicines into the affected vein to break up the clot (catheter-directed thrombolysis). This is used only for severe DVT and only if a specialist recommends it.    •Specific surgical procedures, when DVT is severe or hard to treat. These may be done to:  •Isolate and remove your clot.      •Place an inferior vena cava (IVC) filter in a large vein to catch blood clots before they reach your lungs.        You may get some medical treatments for 6 months or longer.      Follow these instructions at home:    If you are taking blood thinners:     •Talk with your health care provider before you take any medicines that contain aspirin or NSAIDs, such as ibuprofen. These medicines increase your risk for dangerous bleeding.      •Take your medicine exactly as told, at the same time every day. Do not skip a dose. Do not take more than the prescribed dose. This is important.      •Ask your health care provider about foods and medicines that could change the way your blood thinner works (may interact). Avoid these foods and medicines if you are told to do so.    •Avoid anything that may cause bleeding or bruising. You may bleed more easily while taking blood thinners.  •Be very careful when using knives, scissors, or other sharp objects.      •Use an electric razor instead of a blade.      •Avoid activities that could cause injury or bruising, and follow instructions for preventing falls.      •Tell your health care provider if you have had any internal bleeding, bleeding ulcers, or neurologic diseases, such as strokes or cerebral aneurysms.        •Wear a medical alert bracelet or carry a card that lists what medicines you take.      General instructions     •Take over-the-counter and prescription medicines only as told by your health care provider.      •Return to your normal activities as told by your health care provider. Ask your health care provider what activities are safe for you.      •If recommended, wear compression stockings as told by your health care provider. These stockings help to prevent blood clots and reduce swelling in your legs.      •Keep all follow-up visits as told by your health care provider. This is important.        Contact a health care provider if:    •You miss a dose of your blood thinner.      •You have new or worse pain, swelling, or redness in an arm or a leg.      •You have worsening numbness or tingling in an arm or a leg.      •You have unusual bruising.        Get help right away if:  •You have signs or symptoms that a blood clot has moved to the lungs. These may include:  •Shortness of breath.      •Chest pain.      •Fast or irregular heartbeats (palpitations).      •Light-headedness or dizziness.      •Coughing up blood.      •You have signs or symptoms that your blood is too thin. These may include:  •Blood in your vomit, stool, or urine.      •A cut that will not stop bleeding.      •A menstrual period that is heavier than usual.      •A severe headache or confusion.        These symptoms may represent a serious problem that is an emergency. Do not wait to see if the symptoms will go away. Get medical help right away. Call your local emergency services (911 in the [place] Do not drive yourself to the hospital.       Summary    •Deep vein thrombosis (DVT) happens when a blood clot forms in a deep vein. This may occur in the lower leg, thigh, pelvis, or arm.      •Symptoms affect the arm or leg and can include swelling, pain, tenderness, warmth, redness, or discoloration.      •This condition may be treated with medicines or compression stockings. In severe cases, surgery may be done.      •If you are taking blood thinners, take them exactly as told. Do not skip a dose. Do not take more than is prescribed.      •Get help right away if you have shortness of breath, chest pain, fast or irregular heartbeats, or blood in your vomit, urine, or stool.      This information is not intended to replace advice given to you by your health care provider. Make sure you discuss any questions you have with your health care provider.

## 2022-05-19 NOTE — ED STATDOCS - OBJECTIVE STATEMENT
37 y/o female with PMHx of hip fracture s/p surgery, c/o left calf pain. Pt reports being sent in for a DVT found in left lower leg which was found on ultrasound today. Pt c/o tenderness to left calf. Pt states having a hip fracture on 5/5/22. Denies chest pain, sob or hx of blood clots. Denies swelling. Pt also states in left foot sometimes changes from cold to hot.

## 2022-05-19 NOTE — ED ADULT TRIAGE NOTE - CHIEF COMPLAINT QUOTE
patient c/o left calf pain for a week ago s/p left hip surgery. had a check up today post surgery and was found to have a calf DVT.

## 2022-05-19 NOTE — ED STATDOCS - NSFOLLOWUPCLINICS_GEN_ALL_ED_FT
[facility]  Hematology/Oncology  [address] [place] [address]  Phone: [phone]  Fax:   Follow Up Time: 4-6 Days

## 2022-05-19 NOTE — ED STATDOCS - PHYSICAL EXAMINATION
VITAL SIGNS: I have reviewed nursing notes and confirm.  CONSTITUTIONAL:  in no acute distress.  SKIN: Skin exam is warm and dry, no acute rash.  HEAD: Normocephalic; atraumatic.  EYES: PERRL, EOM intact; conjunctiva and sclera clear.  ENT: No nasal discharge; airway clear. Throat clear.  NECK: Supple; non tender.    CARD: Regular rate and rhythm.  RESP: No wheezes,  no rales or rhonchi.   ABD:  soft; non-distended; non-tender;   EXT: Normal ROM. No clubbing, cyanosis or edema. Mild left calf tenderness. Good pulses   NEURO: Alert, oriented. Grossly unremarkable. No focal deficits.  moves all extremities,  normal gait   PSYCH: Cooperative, appropriate. VITAL SIGNS: I have reviewed nursing notes and confirm.  CONSTITUTIONAL:  in no acute distress.  SKIN: Skin exam is warm and dry, no acute rash.  HEAD: Normocephalic; atraumatic.  EYES: PERRL, EOM intact; conjunctiva and sclera clear.  ENT: No nasal discharge; airway clear. Throat clear.  NECK: Supple; non tender.    CARD: Regular rate and rhythm.  RESP: No wheezes,  no rales or rhonchi.   ABD:  soft; non-distended; non-tender;   EXT: Normal ROM. No clubbing, cyanosis or edema. (+)Mild left calf tenderness. Good pulses   NEURO: Alert, oriented. Grossly unremarkable. No focal deficits.  moves all extremities,    PSYCH: Cooperative, appropriate.

## 2022-05-19 NOTE — ED STATDOCS - CLINICAL SUMMARY MEDICAL DECISION MAKING FREE TEXT BOX
Pt s/p hip surgery with left peroneal and left posterior tibial vein DVT found on ultrasound DP. No chest pain or sob. Will get blood work and start on anticoagulation meds.

## 2022-05-19 NOTE — ED STATDOCS - PROGRESS NOTE DETAILS
CRYSTAL Oh NOTE: Reviewed all results and plan; will Rx Xarelto, advised on follow up with outpatient specialists, Pt stable for d/c, reports improvement, tolerating PO, ambulatory.  Discussion includes results, plan, proper medication use/side effects, and return precautions. Pt advised to f/u with PMD 1-2 days and specialists discussed.  Printed copies of available lab/radiology results contained within discharge packet. Pt verbalized understanding/agreement of plan. CRYSTAL Oh NOTE: Pt evaluated at bedside..   US shows "Bilateral knee thrombus involving the left peroneal and to lesser degree   left posterior tibial vein"   Pt evaluated prior by intake physician. Otherwise HPI/PE/ROS as noted above. Will follow up plan per intake physician. CRYSTAL Oh NOTE: Reviewed all results and plan; will Rx Xarelto, advised on follow up with outpatient specialists, Pt stable for d/c, reports improvement, tolerating PO, ambulatory.    Spoke to [facility], Rx is covered  Discussion includes results, plan, proper medication use/side effects, and return precautions. Pt advised to f/u with PMD 1-2 days and specialists discussed.  Printed copies of available lab/radiology results contained within discharge packet. Pt verbalized understanding/agreement of plan.

## 2022-05-23 ENCOUNTER — OUTPATIENT (OUTPATIENT)
Dept: OUTPATIENT SERVICES | Facility: HOSPITAL | Age: 37
LOS: 1 days | Discharge: HOME | End: 2022-05-23

## 2022-05-23 DIAGNOSIS — K08.409 PARTIAL LOSS OF TEETH, UNSPECIFIED CAUSE, UNSPECIFIED CLASS: Chronic | ICD-10-CM

## 2022-05-23 DIAGNOSIS — I82.409 ACUTE EMBOLISM AND THROMBOSIS OF UNSPECIFIED DEEP VEINS OF UNSPECIFIED LOWER EXTREMITY: ICD-10-CM

## 2022-05-26 ENCOUNTER — APPOINTMENT (OUTPATIENT)
Dept: TRAUMA SURGERY | Facility: CLINIC | Age: 37
End: 2022-05-26

## 2022-05-26 ENCOUNTER — APPOINTMENT (OUTPATIENT)
Dept: HEMATOLOGY ONCOLOGY | Facility: CLINIC | Age: 37
End: 2022-05-26
Payer: COMMERCIAL

## 2022-05-26 VITALS
HEIGHT: 70 IN | HEART RATE: 87 BPM | BODY MASS INDEX: 19.47 KG/M2 | OXYGEN SATURATION: 98 % | SYSTOLIC BLOOD PRESSURE: 126 MMHG | DIASTOLIC BLOOD PRESSURE: 84 MMHG | WEIGHT: 136 LBS

## 2022-05-26 PROCEDURE — 99203 OFFICE O/P NEW LOW 30 MIN: CPT

## 2022-05-26 RX ORDER — RIVAROXABAN 20 MG/1
20 TABLET, FILM COATED ORAL
Qty: 30 | Refills: 3 | Status: ACTIVE | COMMUNITY
Start: 2022-05-26 | End: 1900-01-01

## 2022-05-26 RX ORDER — ASPIRIN 325 MG/1
325 TABLET, FILM COATED ORAL TWICE DAILY
Qty: 30 | Refills: 0 | Status: DISCONTINUED | COMMUNITY
Start: 2022-05-05 | End: 2022-05-26

## 2022-05-26 NOTE — HISTORY OF PRESENT ILLNESS
[de-identified] : 37 F here for evaluation of DVT. Pt had closed reduction and percutaneous pinning of a left femoral neck  stress fracture on 5/5/22.  on Post op follow up on on 5/19, she had complaint of LLE pain. UD duplex on 5/19/22 showed DVT involving left peroneal and left posterior tibial vein. She was started on Xarelto. Pt denies any personal or family hx of VTE. Currently on OCP. Never smoker. \par \par Cancer screening - up to date on pap smear\par

## 2022-05-26 NOTE — PHYSICAL EXAM
[Restricted in physically strenuous activity but ambulatory and able to carry out work of a light or sedentary nature] : Status 1- Restricted in physically strenuous activity but ambulatory and able to carry out work of a light or sedentary nature, e.g., light house work, office work [Normal] : affect appropriate [de-identified] : + calf tenderness on the left

## 2022-05-26 NOTE — ASSESSMENT
[FreeTextEntry1] : 37 F here for evaluation of DVT. Pt had closed reduction and percutaneous pinning of a left femoral neck  stress fracture on 5/5/22.  on Post op follow up on on 5/19, she had complaint of LLE pain. UD duplex on 5/19/22 showed DVT involving left peroneal and left posterior tibial vein. She was started on Xarelto. Pt denies any personal or family hx of VTE. Currently on OCP. Never smoker. \par \par \par \par # DVT\par -provoked, perioperative\par -below knee DVT\par -no need for thrombophilia workup as it is provoked, pt without family hx\par -pt is still recovering from orthopedic surgery and not fully mobile\par -will treat with Xarelto for 3 months\par \par Cancer screening - up to date on pap smear\par \par \par RTC in 3 months

## 2022-06-01 ENCOUNTER — APPOINTMENT (OUTPATIENT)
Dept: ULTRASOUND IMAGING | Facility: CLINIC | Age: 37
End: 2022-06-01

## 2022-06-02 ENCOUNTER — APPOINTMENT (OUTPATIENT)
Dept: VASCULAR SURGERY | Facility: CLINIC | Age: 37
End: 2022-06-02
Payer: COMMERCIAL

## 2022-06-02 VITALS — SYSTOLIC BLOOD PRESSURE: 107 MMHG | HEART RATE: 80 BPM | DIASTOLIC BLOOD PRESSURE: 67 MMHG | OXYGEN SATURATION: 97 %

## 2022-06-02 PROCEDURE — 99203 OFFICE O/P NEW LOW 30 MIN: CPT

## 2022-06-02 PROCEDURE — 93971 EXTREMITY STUDY: CPT

## 2022-06-05 NOTE — PHYSICAL EXAM
[Normal Rate and Rhythm] : normal rate and rhythm [2+] : left 2+ [Ankle Swelling (On Exam)] : not present [Varicose Veins Of Lower Extremities] : not present [] : not present [Abdomen Tenderness] : ~T ~M No abdominal tenderness [No Rash or Lesion] : No rash or lesion [Alert] : alert [Oriented to Person] : oriented to person [Oriented to Place] : oriented to place [Oriented to Time] : oriented to time [Calm] : calm [de-identified] : NAD [de-identified] : supple, no masses [de-identified] : unlabored breathing [de-identified] : FROM of all 4 extremities\par

## 2022-06-05 NOTE — HISTORY OF PRESENT ILLNESS
[FreeTextEntry1] : 38 yo F with history of osteoporosis and recent L hip surgery for fracture recently diagnosed with left peroneal and PT vein DVT. Started on xarelto. She is tolerating it, and no complaints. She is being followed by heme for hypercoagulable work up. Ambulates with a cane due to recent hip surgery. Denies claudication, rest pain, ulceration, toe discoloration or pain\par

## 2022-06-14 ENCOUNTER — APPOINTMENT (OUTPATIENT)
Dept: ORTHOPEDIC SURGERY | Facility: CLINIC | Age: 37
End: 2022-06-14
Payer: COMMERCIAL

## 2022-06-14 PROCEDURE — 73502 X-RAY EXAM HIP UNI 2-3 VIEWS: CPT | Mod: 1L

## 2022-06-14 PROCEDURE — 73522 X-RAY EXAM HIPS BI 3-4 VIEWS: CPT

## 2022-06-14 PROCEDURE — 99024 POSTOP FOLLOW-UP VISIT: CPT

## 2022-06-14 RX ORDER — CEPHALEXIN 500 MG/1
500 CAPSULE ORAL EVERY 6 HOURS
Qty: 28 | Refills: 0 | Status: ACTIVE | COMMUNITY
Start: 2022-06-14 | End: 1900-01-01

## 2022-06-14 NOTE — HISTORY OF PRESENT ILLNESS
[de-identified] : DOS: 05/05/2022\par s/p closed reduction and percutaneous pinning of a left femoral neck stress fracture. [de-identified] : DOS: 05/05/2022\aleksandr Jay is a 37-year-old female s/p closed reduction and percutaneous pinning of a left femoral neck stress fracture.  She states she has been doing well since the surgery however the other day noticed redness and swelling and warmth over the incision while she was at physical therapy and the physical therapist notified her to see her doctor.  She states since then the pain and swelling and redness has improved but she still has a little bit of tenderness over the hip.  She states she has been improving with physical therapy.  She denies any new acute injury.  She denies any numbness or tingling distally.  She denies any fevers, chills, sweats, drainage or pain elsewhere.  She remains on the Xarelto for her DVT. [de-identified] : On exam, the patient is pleasant.  They  are awake, alert, and oriented x3.  The patient presents ambulating with a cane today.\par Weight is appropriate for height\par Full range of motion of cervical spine without instability\par Full range of motion of back without instability\par Intact neurologic, vascular, and dermatologic exam to right and left upper extremities\par Intact neurologic, vascular, and dermatologic exam to right and left lower extremities.\par \par Left lower Extremity\par The patient's incision is well approximated and healing well.  Minimal erythema about the incision.  No warmth, drainage, or palpable fluctuance.  There is no postoperative swelling about the left hip.  Mild periincisional tenderness.  Range of motion: Full range of motion in all planes pain-free, negative logroll test and can straight leg raise against gravity.  EHL/FHL/TA/GSC motor function is intact, sensations intact light touch in L2-S1 distributions, DP/PT pulses are palpable, compartments are soft and compressible.\par  [de-identified] : X-ray 2 views of the left hip and pelvis taken in the office today on 6/14/2022 shows intact hardware in good position with maintenance of fracture alignment and suggestion of consolidation of the fracture of the inferior femoral neck [de-identified] : 37-year-old female status post closed reduction and percutaneous pinning of a left femoral neck stress fracture on 5/5/2022 [de-identified] : Rubina is recovering well from her recent surgery.  She has had improvements in her pain, swelling, and range of motion since the day of surgery.  I have a very low suspicion for a superficial wound infection based on the patient's examination today.  She reports significant improvement in her symptoms since the weekend.  It is possible that some of the irritation she was feeling could be related to a hematoma since she is on the Xarelto which she has been treated for for her DVT.  I recommend icing this area to help with any residual swelling.  A prescription for Keflex was sent to her pharmacy for prophylaxis.  A new referral for physical therapy was provided today to continue strengthening her hip and pelvis.  She is encouraged to discontinue the cane when she is comfortable.  She will follow-up with her primary care doctor regarding her recent bone scan results which suggest osteoporosis.  Recommend follow-up in 6 weeks for repeat clinical evaluation.  She verbalizes her understanding and agrees with the plan.  All questions were answered to her satisfaction.

## 2022-06-14 NOTE — DISCUSSION/SUMMARY
[Home] : at home, [unfilled] , at the time of the visit. [Medical Office: ([facility])___] : at the medical office located in  [FreeTextEntry1] : My office reach out to Dr. Castañeda office.  Dr. Castañeda is not in today.  The patient was advised to go to the emergency department directly as soon as possible.  She verbalizes her understanding and agrees.  All questions were answered her satisfaction.

## 2022-07-28 ENCOUNTER — APPOINTMENT (OUTPATIENT)
Dept: ORTHOPEDIC SURGERY | Facility: CLINIC | Age: 37
End: 2022-07-28

## 2022-07-28 PROCEDURE — 73502 X-RAY EXAM HIP UNI 2-3 VIEWS: CPT

## 2022-07-28 PROCEDURE — 99024 POSTOP FOLLOW-UP VISIT: CPT

## 2022-07-28 NOTE — HISTORY OF PRESENT ILLNESS
[___ Months Post Op] : [unfilled] months post op [Xray (Date:___)] : [unfilled] Xray -  [de-identified] : DOS: 05/05/2022\par s/p closed reduction and percutaneous pinning of a left femoral neck stress fracture. [de-identified] : Rubina is a 37-year-old female s/p closed reduction and percutaneous pinning of a left femoral neck stress fracture.  She states she has been doing well since the surgery.  She states that she is still on the blood thinners as prescribed by her hematologist and has a follow-up next month for this.  She states she is working physical therapy and notes improvement in her strength.  She has no pain in her hip or leg at this point and is able to ambulate comfortably without assistive devices.  She is here for routine follow-up today.  Of note, the patient was recently diagnosed with early onset osteoporosis and has been referred to a specialist in [place] for further work-up and evaluation of this.  She denies any fevers, chills, chest pain, shortness of breath.  She denies any numbness or tingling distally.  [de-identified] : On exam, the patient is pleasant.  They  are awake, alert, and oriented x3.  The patient presents ambulating without any assistive devices.\par Weight is appropriate for height\par Full range of motion of cervical spine without instability\par Full range of motion of back without instability\par Intact neurologic, vascular, and dermatologic exam to right and left upper extremities\par Intact neurologic, vascular, and dermatologic exam to right and left lower extremities.\par \par Left lower Extremity\par The patient's incision is well healed.  Minimal erythema about the incision.  No warmth, drainage, or palpable fluctuance.  There is no postoperative swelling about the left hip.  Minimal periincisional tenderness.  Range of motion: Full range of motion in all planes pain-free, negative logroll test and can straight leg raise against gravity.  EHL/FHL/TA/GSC motor function is intact, sensations intact light touch in L2-S1 distributions, DP/PT pulses are palpable, compartments are soft and compressible.\par  [de-identified] : X-ray 2 views of the left hip taken in the office today on 7/20/2022 showed complete healing of her fracture with intact hardware and no evidence of loosening or cut out. [de-identified] : 37-year-old female status post closed reduction and percutaneous pinning of a left femoral neck stress fracture on 5/5/2022 [de-identified] : Rubina is recovering well from her recent surgery.  She has no pain and is ambulating comfortable without assistive devices.  She will continue with therapy to help work on lower extremity strengthening exercises. She will follow up with her hematologist regarding her VTE.she will follow-up with the osteoporosis specialist in [place].  Follow up in 3 months for repeat evaluation. She verbalizes her understanding and agrees with the plan. All questions answered to her satisfaction.

## 2022-07-28 NOTE — DISCUSSION/SUMMARY
[Home] : at home, [unfilled] , at the time of the visit. [Medical Office: ([facility])___] : at the medical office located in  [FreeTextEntry1] : My office reach out to Dr. Castañeda office.  Dr. Castañeda is not in today.  The patient was advised to go to the emergency department directly as soon as possible.  She verbalizes her understanding and agrees.  All questions were answered her satisfaction.

## 2022-08-01 ENCOUNTER — APPOINTMENT (OUTPATIENT)
Dept: OBGYN | Facility: CLINIC | Age: 37
End: 2022-08-01

## 2022-08-01 ENCOUNTER — NON-APPOINTMENT (OUTPATIENT)
Age: 37
End: 2022-08-01

## 2022-08-01 VITALS
SYSTOLIC BLOOD PRESSURE: 112 MMHG | WEIGHT: 138 LBS | HEIGHT: 70 IN | BODY MASS INDEX: 19.76 KG/M2 | DIASTOLIC BLOOD PRESSURE: 70 MMHG

## 2022-08-01 PROCEDURE — 99395 PREV VISIT EST AGE 18-39: CPT

## 2022-08-01 NOTE — DISCUSSION/SUMMARY
[FreeTextEntry1] : Well woman exam\par \par pap done \par return in 1 year\par we discussed trying Mirena iud to help with heavy periods if she finds they are getting worse

## 2022-08-01 NOTE — HISTORY OF PRESENT ILLNESS
[FreeTextEntry1] : 38 yo p1 here for annual exam. has 1.5yr old son, had  at 36 weeks , uncomplicated.had multiple problems w breast feeding- clogged ducts, mastitis.  She had a non traumatic left hip fx in april, surgery may,2022 then dev a dvt in leg. Stopped ocp 1 month ago. On blood thinner now.  Period this month heavier than usual, changed pads every 2 hours,no longer than usual. Seeing an osteoporosis specialist in [facility], had t score-3.0\par \par  Works for [employer].

## 2022-08-03 LAB — HPV HIGH+LOW RISK DNA PNL CVX: NOT DETECTED

## 2022-08-08 LAB — CYTOLOGY CVX/VAG DOC THIN PREP: NORMAL

## 2022-08-10 ENCOUNTER — OUTPATIENT (OUTPATIENT)
Dept: OUTPATIENT SERVICES | Facility: HOSPITAL | Age: 37
LOS: 1 days | Discharge: HOME | End: 2022-08-10

## 2022-08-10 DIAGNOSIS — K08.409 PARTIAL LOSS OF TEETH, UNSPECIFIED CAUSE, UNSPECIFIED CLASS: Chronic | ICD-10-CM

## 2022-08-10 DIAGNOSIS — I82.409 ACUTE EMBOLISM AND THROMBOSIS OF UNSPECIFIED DEEP VEINS OF UNSPECIFIED LOWER EXTREMITY: ICD-10-CM

## 2022-08-15 ENCOUNTER — APPOINTMENT (OUTPATIENT)
Dept: HEMATOLOGY ONCOLOGY | Facility: CLINIC | Age: 37
End: 2022-08-15

## 2022-08-15 VITALS
WEIGHT: 139.03 LBS | OXYGEN SATURATION: 100 % | BODY MASS INDEX: 19.9 KG/M2 | DIASTOLIC BLOOD PRESSURE: 77 MMHG | HEART RATE: 91 BPM | SYSTOLIC BLOOD PRESSURE: 115 MMHG | HEIGHT: 70 IN

## 2022-08-15 DIAGNOSIS — I82.409 ACUTE EMBOLISM AND THROMBOSIS OF UNSPECIFIED DEEP VEINS OF UNSPECIFIED LOWER EXTREMITY: ICD-10-CM

## 2022-08-15 PROCEDURE — 99213 OFFICE O/P EST LOW 20 MIN: CPT

## 2022-08-15 NOTE — PHYSICAL EXAM
[Restricted in physically strenuous activity but ambulatory and able to carry out work of a light or sedentary nature] : Status 1- Restricted in physically strenuous activity but ambulatory and able to carry out work of a light or sedentary nature, e.g., light house work, office work [Normal] : affect appropriate [de-identified] : + calf tenderness on the left

## 2022-08-15 NOTE — ASSESSMENT
[FreeTextEntry1] : 37 F here for evaluation of DVT. Pt had closed reduction and percutaneous pinning of a left femoral neck  stress fracture on 5/5/22.  on Post op follow up on on 5/19, she had complaint of LLE pain. UD duplex on 5/19/22 showed DVT involving left peroneal and left posterior tibial vein. She was started on Xarelto. Pt denies any personal or family hx of VTE. Currently on OCP. Never smoker. \par \par \par \par # DVT\par -provoked, perioperative\par -below knee DVT\par -no need for thrombophilia workup as it is provoked, pt without family hx\par -pt has now fully recovered from orthopedic surgery, still doing PT.\par -will complete Xarelto in 1 months\par -as pt is no longer symptomatic she can safely stop AC after 3 months. Discussed with her that given hx of DVT, there is an increase risk of recurrent VTE, however, the risk does not warrant long term AC. Discuss s&S of VTE to be aware of.\par \par \par Cancer screening - up to date on pap smear, mammogram in 2021 was normal\par \par \par RTC as needed

## 2022-08-15 NOTE — HISTORY OF PRESENT ILLNESS
[de-identified] : 37 F here for evaluation of DVT. Pt had closed reduction and percutaneous pinning of a left femoral neck  stress fracture on 5/5/22.  on Post op follow up on on 5/19, she had complaint of LLE pain. UD duplex on 5/19/22 showed DVT involving left peroneal and left posterior tibial vein. She was started on Xarelto. Pt denies any personal or family hx of VTE. Father had DVT after a hip surgery. Currently on OCP. Never smoker. \par \par \par Cancer screening - up to date on pap smear, mammogram in 2021 was normal\par  [de-identified] : 8/15/22: Rubina is here for fu for provoked left DVT after orthopedic sx. She has been on Xarelto. She has one more refill to finish 3 months course. No bleeding episode. No longer has swelling or pain of LLE. Denies HA. CP, SOB, abd pain, constipation, diarrhea, melena, hematuria, dysuria.

## 2022-08-18 ENCOUNTER — OUTPATIENT (OUTPATIENT)
Dept: OUTPATIENT SERVICES | Facility: HOSPITAL | Age: 37
LOS: 1 days | End: 2022-08-18
Payer: COMMERCIAL

## 2022-08-18 ENCOUNTER — APPOINTMENT (OUTPATIENT)
Dept: RADIOLOGY | Facility: CLINIC | Age: 37
End: 2022-08-18

## 2022-08-18 DIAGNOSIS — Z00.8 ENCOUNTER FOR OTHER GENERAL EXAMINATION: ICD-10-CM

## 2022-08-18 DIAGNOSIS — K08.409 PARTIAL LOSS OF TEETH, UNSPECIFIED CAUSE, UNSPECIFIED CLASS: Chronic | ICD-10-CM

## 2022-08-18 PROCEDURE — 72100 X-RAY EXAM L-S SPINE 2/3 VWS: CPT | Mod: 26

## 2022-08-18 PROCEDURE — 72070 X-RAY EXAM THORAC SPINE 2VWS: CPT | Mod: 26

## 2022-08-18 PROCEDURE — 72070 X-RAY EXAM THORAC SPINE 2VWS: CPT

## 2022-08-18 PROCEDURE — 72100 X-RAY EXAM L-S SPINE 2/3 VWS: CPT

## 2022-08-19 ENCOUNTER — APPOINTMENT (OUTPATIENT)
Dept: VASCULAR SURGERY | Facility: CLINIC | Age: 37
End: 2022-08-19

## 2022-09-23 ENCOUNTER — APPOINTMENT (OUTPATIENT)
Dept: VASCULAR SURGERY | Facility: CLINIC | Age: 37
End: 2022-09-23

## 2022-10-28 ENCOUNTER — APPOINTMENT (OUTPATIENT)
Dept: ORTHOPEDIC SURGERY | Facility: CLINIC | Age: 37
End: 2022-10-28

## 2022-10-28 VITALS
HEART RATE: 77 BPM | SYSTOLIC BLOOD PRESSURE: 105 MMHG | WEIGHT: 139 LBS | DIASTOLIC BLOOD PRESSURE: 69 MMHG | BODY MASS INDEX: 19.9 KG/M2 | HEIGHT: 70 IN

## 2022-10-28 PROCEDURE — 73502 X-RAY EXAM HIP UNI 2-3 VIEWS: CPT

## 2022-10-28 PROCEDURE — 99213 OFFICE O/P EST LOW 20 MIN: CPT

## 2022-10-28 NOTE — REASON FOR VISIT
[Follow-Up Visit] : a follow-up visit for [FreeTextEntry2] : DOS: 05/05/2022\par s/p closed reduction and percutaneous pinning of a left femoral neck stress fracture.

## 2022-10-28 NOTE — HISTORY OF PRESENT ILLNESS
[___ Months Post Op] : [unfilled] months post op [Xray (Date:___)] : [unfilled] Xray -  [de-identified] : DOS: 05/05/2022\par s/p closed reduction and percutaneous pinning of a left femoral neck stress fracture. [de-identified] : Rubina is a 37-year-old female s/p closed reduction and percutaneous pinning of a left femoral neck stress fracture.  The patient states she is doing well and has no pain at this point.  She has returned to most of her normal activities as tolerated without difficulty.  She occasionally feels some soreness about the incision and still feels as though she has not regained her full strength but is otherwise happy with her outcome.  She is seeing an osteoporosis specialist who is recommending medical treatment.  The patient is also seeing her GYN doctor about trying for pregnancy.  She returns today for routine follow-up.  The patient denies any fevers, chills, sweats, recent illnesses, numbness, tingling, weakness, or pain elsewhere at this time. [de-identified] : On exam, the patient is pleasant.  They  are awake, alert, and oriented x3.  The patient presents ambulating without any assistive devices.\par Weight is appropriate for height\par Full range of motion of cervical spine without instability\par Full range of motion of back without instability\par Intact neurologic, vascular, and dermatologic exam to right and left upper extremities\par Intact neurologic, vascular, and dermatologic exam to right and left lower extremities.\par \par Left lower Extremity\par The patient's incision is well healed.  No erythema about the incision.  No warmth, drainage, or palpable fluctuance.  There is no postoperative swelling about the left hip.  Minimal periincisional tenderness.  Range of motion: Full range of motion in all planes pain-free, negative logroll test and can straight leg raise against gravity.  EHL/FHL/TA/GSC motor function is intact, sensations intact light touch in L2-S1 distributions, DP/PT pulses are palpable, compartments are soft and compressible.\par  [de-identified] : X-ray 2 views of the left hip taken in the office today on 10/28/2022 demonstrate complete healing of her stress fracture in anatomic alignment.  Hardware is intact without any evidence of loosening or cut out. [de-identified] : 37-year-old female status post closed reduction and percutaneous pinning of a left femoral neck stress fracture on 5/5/2022 [de-identified] : Rubina has recovered well from her hip surgery.  She has had complete alleviation of her pain.  She occasionally has some soreness about the incision which she can continue to massage with scar cream.  She may continue with her normal activities as tolerated without restriction.  She may continue to do weight training and strengthening exercises on her own to help with her overall strength and conditioning.  I recommended that the patient have a complete discussion both with her primary care physician as well as her obstetrician about the risks and benefits of trying for pregnancy at 37 years of age while delaying treatment for her osteoporosis.  At this point she may follow-up with me in 6 months for routine assessment.  The patient denies any fevers, chills, sweats, recent illnesses, numbness, tingling, weakness, or pain elsewhere at this time.

## 2022-10-28 NOTE — DISCUSSION/SUMMARY
[Home] : at home, [unfilled] , at the time of the visit. [Medical Office: ([facility])___] : at the medical office located in

## 2022-12-15 ENCOUNTER — NON-APPOINTMENT (OUTPATIENT)
Age: 37
End: 2022-12-15

## 2023-01-13 ENCOUNTER — APPOINTMENT (OUTPATIENT)
Dept: OBGYN | Facility: CLINIC | Age: 38
End: 2023-01-13
Payer: COMMERCIAL

## 2023-01-13 VITALS
DIASTOLIC BLOOD PRESSURE: 76 MMHG | HEIGHT: 70 IN | WEIGHT: 137 LBS | BODY MASS INDEX: 19.61 KG/M2 | SYSTOLIC BLOOD PRESSURE: 114 MMHG

## 2023-01-13 DIAGNOSIS — Z87.39 PERSONAL HISTORY OF OTHER DISEASES OF THE MUSCULOSKELETAL SYSTEM AND CONNECTIVE TISSUE: ICD-10-CM

## 2023-01-13 DIAGNOSIS — Z31.69 ENCOUNTER FOR OTHER GENERAL COUNSELING AND ADVICE ON PROCREATION: ICD-10-CM

## 2023-01-13 PROCEDURE — 81025 URINE PREGNANCY TEST: CPT

## 2023-01-13 PROCEDURE — 99214 OFFICE O/P EST MOD 30 MIN: CPT

## 2023-01-14 ENCOUNTER — LABORATORY RESULT (OUTPATIENT)
Age: 38
End: 2023-01-14

## 2023-01-15 PROBLEM — Z87.39 HISTORY OF OSTEOPOROSIS: Status: RESOLVED | Noted: 2023-01-15 | Resolved: 2023-01-15

## 2023-01-15 PROBLEM — Z31.69 ENCOUNTER FOR PRECONCEPTION CONSULTATION: Status: ACTIVE | Noted: 2023-01-15

## 2023-01-15 NOTE — HISTORY OF PRESENT ILLNESS
[FreeTextEntry1] : 36 yo p1 here for preconceptual consultation. had spontaneous hip fracture, was followed up , work up found osteoporosis. pt had work up w endocrinologist, found no metabolic disease. \par has been advised to start medications that are not compatible with pregnancy, given ama, attempting conception at present. \par pt was not a smoker , no hx steroid use, no known risk factors. was on low dose ocps for many years.

## 2023-01-15 NOTE — PHYSICAL EXAM
[Alert] : alert [No Acute Distress] : no acute distress [Regular Rate Rhythm] : regular rate rhythm [No Murmurs] : no murmurs [Non-tender] : non-tender [Non-distended] : non-distended [No HSM] : No HSM [No Mass] : no mass

## 2023-01-15 NOTE — PLAN
[FreeTextEntry1] : sp spontaneous hip fx and dx of osteoporosis , work up negative. \par attempting to conceive. check day 3 bw. has under 1 yo baby.\par dw pt ov pred kits, mucinex at ovulation, dhea supplementation. \par dw pt consideration of ovulation induction if pregnancy is not achieved after 6 mos. \par spent 30 + minutes in consultation.

## 2023-01-22 DIAGNOSIS — E22.1 HYPERPROLACTINEMIA: ICD-10-CM

## 2023-01-22 LAB
HCG UR QL: NEGATIVE
QUALITY CONTROL: YES

## 2023-02-13 LAB — PROLACTIN SERPL-MCNC: 23.9 NG/ML

## 2023-03-07 ENCOUNTER — APPOINTMENT (OUTPATIENT)
Dept: OBGYN | Facility: CLINIC | Age: 38
End: 2023-03-07
Payer: COMMERCIAL

## 2023-03-07 VITALS
HEIGHT: 70 IN | WEIGHT: 137 LBS | DIASTOLIC BLOOD PRESSURE: 70 MMHG | SYSTOLIC BLOOD PRESSURE: 110 MMHG | BODY MASS INDEX: 19.61 KG/M2

## 2023-03-07 PROCEDURE — 76830 TRANSVAGINAL US NON-OB: CPT

## 2023-03-07 PROCEDURE — 99214 OFFICE O/P EST MOD 30 MIN: CPT

## 2023-03-07 PROCEDURE — 81025 URINE PREGNANCY TEST: CPT

## 2023-03-07 NOTE — PLAN
[FreeTextEntry1] : dw pt dietary and activity restrictions. has hip replacement and osteoporosis, cont jessica , vit D\par will consult w orthopod re nsv vs cs. ]\par folic acid, pnv\par fu 2 weeks pn1

## 2023-03-07 NOTE — HISTORY OF PRESENT ILLNESS
[FreeTextEntry1] : 36 yo p1 (has 3 yo at home) here for eval of sec amen. lmp 1/11. periods regular. reports mild nausea, declines med tx. \par denies vb, pel pain. taking pnv and calcium, had osteoporotic fx and hip replacement last yr.

## 2023-03-07 NOTE — PROCEDURE
[Transvaginal OB Sonogram] : Transvaginal OB Sonogram [Intrauterine Pregnancy] : intrauterine pregnancy [Yolk Sac] : yolk sac present [Fetal Heart] : fetal heart present [CRL: ___ (mm)] : CRL - [unfilled]Umm [Date: ___] : Date: [unfilled] [Current GA by Sonogram: ___ (wks)] : Current GA by Sonogram: [unfilled]Uwks [___ day(s)] : [unfilled] days [Transvaginal OB Sonogram WNL] : Transvaginal OB Sonogram WNL [FreeTextEntry1] : small for dates- by 6 days.

## 2023-03-11 LAB — HCG UR QL: POSITIVE

## 2023-03-19 ENCOUNTER — NON-APPOINTMENT (OUTPATIENT)
Age: 38
End: 2023-03-19

## 2023-03-20 ENCOUNTER — NON-APPOINTMENT (OUTPATIENT)
Age: 38
End: 2023-03-20

## 2023-03-20 ENCOUNTER — APPOINTMENT (OUTPATIENT)
Dept: OBGYN | Facility: CLINIC | Age: 38
End: 2023-03-20
Payer: COMMERCIAL

## 2023-03-20 ENCOUNTER — APPOINTMENT (OUTPATIENT)
Dept: ANTEPARTUM | Facility: CLINIC | Age: 38
End: 2023-03-20
Payer: COMMERCIAL

## 2023-03-20 ENCOUNTER — ASOB RESULT (OUTPATIENT)
Age: 38
End: 2023-03-20

## 2023-03-20 VITALS
SYSTOLIC BLOOD PRESSURE: 118 MMHG | BODY MASS INDEX: 19.9 KG/M2 | HEIGHT: 70 IN | WEIGHT: 139 LBS | DIASTOLIC BLOOD PRESSURE: 80 MMHG

## 2023-03-20 PROCEDURE — 99214 OFFICE O/P EST MOD 30 MIN: CPT

## 2023-03-20 PROCEDURE — 76817 TRANSVAGINAL US OBSTETRIC: CPT

## 2023-03-20 NOTE — PLAN
[FreeTextEntry1] : missed ab noted on us. \par dw pt fidings and rba d v c.  plan procedure at [facility]. \par spent 30 min in encounter.

## 2023-03-20 NOTE — HISTORY OF PRESENT ILLNESS
[FreeTextEntry1] : 38 yo p1 here to start prenatal care, but us shows missed ab today.\par pt has confirmatory official us w tech, which confirms embryonic demise.

## 2023-03-21 ENCOUNTER — TRANSCRIPTION ENCOUNTER (OUTPATIENT)
Age: 38
End: 2023-03-21

## 2023-03-21 ENCOUNTER — OUTPATIENT (OUTPATIENT)
Dept: OUTPATIENT SERVICES | Facility: HOSPITAL | Age: 38
LOS: 1 days | End: 2023-03-21
Payer: COMMERCIAL

## 2023-03-21 VITALS
SYSTOLIC BLOOD PRESSURE: 100 MMHG | WEIGHT: 137.57 LBS | OXYGEN SATURATION: 93 % | DIASTOLIC BLOOD PRESSURE: 60 MMHG | HEART RATE: 84 BPM | TEMPERATURE: 98 F | HEIGHT: 70 IN | RESPIRATION RATE: 16 BRPM

## 2023-03-21 DIAGNOSIS — Z87.39 PERSONAL HISTORY OF OTHER DISEASES OF THE MUSCULOSKELETAL SYSTEM AND CONNECTIVE TISSUE: ICD-10-CM

## 2023-03-21 DIAGNOSIS — Z98.890 OTHER SPECIFIED POSTPROCEDURAL STATES: Chronic | ICD-10-CM

## 2023-03-21 DIAGNOSIS — I82.409 ACUTE EMBOLISM AND THROMBOSIS OF UNSPECIFIED DEEP VEINS OF UNSPECIFIED LOWER EXTREMITY: ICD-10-CM

## 2023-03-21 DIAGNOSIS — Z01.818 ENCOUNTER FOR OTHER PREPROCEDURAL EXAMINATION: ICD-10-CM

## 2023-03-21 DIAGNOSIS — Z29.9 ENCOUNTER FOR PROPHYLACTIC MEASURES, UNSPECIFIED: ICD-10-CM

## 2023-03-21 DIAGNOSIS — K08.409 PARTIAL LOSS OF TEETH, UNSPECIFIED CAUSE, UNSPECIFIED CLASS: Chronic | ICD-10-CM

## 2023-03-21 DIAGNOSIS — O02.1 MISSED ABORTION: ICD-10-CM

## 2023-03-21 LAB
A1C WITH ESTIMATED AVERAGE GLUCOSE RESULT: 4.9 % — SIGNIFICANT CHANGE UP (ref 4–5.6)
ANION GAP SERPL CALC-SCNC: 11 MMOL/L — SIGNIFICANT CHANGE UP (ref 5–17)
APTT BLD: 30.4 SEC — SIGNIFICANT CHANGE UP (ref 27.5–35.5)
BLD GP AB SCN SERPL QL: SIGNIFICANT CHANGE UP
BUN SERPL-MCNC: 8.6 MG/DL — SIGNIFICANT CHANGE UP (ref 8–20)
CALCIUM SERPL-MCNC: 9.2 MG/DL — SIGNIFICANT CHANGE UP (ref 8.4–10.5)
CHLORIDE SERPL-SCNC: 102 MMOL/L — SIGNIFICANT CHANGE UP (ref 96–108)
CO2 SERPL-SCNC: 25 MMOL/L — SIGNIFICANT CHANGE UP (ref 22–29)
COMMENT - BLOOD BANK: SIGNIFICANT CHANGE UP
CREAT SERPL-MCNC: 0.49 MG/DL — LOW (ref 0.5–1.3)
EGFR: 123 ML/MIN/1.73M2 — SIGNIFICANT CHANGE UP
ESTIMATED AVERAGE GLUCOSE: 94 MG/DL — SIGNIFICANT CHANGE UP (ref 68–114)
GLUCOSE SERPL-MCNC: 79 MG/DL — SIGNIFICANT CHANGE UP (ref 70–99)
HCG SERPL-ACNC: HIGH MIU/ML
HCT VFR BLD CALC: 40.4 % — SIGNIFICANT CHANGE UP (ref 34.5–45)
HGB BLD-MCNC: 13.6 G/DL — SIGNIFICANT CHANGE UP (ref 11.5–15.5)
INR BLD: 1.05 RATIO — SIGNIFICANT CHANGE UP (ref 0.88–1.16)
MCHC RBC-ENTMCNC: 31.3 PG — SIGNIFICANT CHANGE UP (ref 27–34)
MCHC RBC-ENTMCNC: 33.7 GM/DL — SIGNIFICANT CHANGE UP (ref 32–36)
MCV RBC AUTO: 92.9 FL — SIGNIFICANT CHANGE UP (ref 80–100)
PLATELET # BLD AUTO: 238 K/UL — SIGNIFICANT CHANGE UP (ref 150–400)
POTASSIUM SERPL-MCNC: 4.6 MMOL/L — SIGNIFICANT CHANGE UP (ref 3.5–5.3)
POTASSIUM SERPL-SCNC: 4.6 MMOL/L — SIGNIFICANT CHANGE UP (ref 3.5–5.3)
PROTHROM AB SERPL-ACNC: 12.2 SEC — SIGNIFICANT CHANGE UP (ref 10.5–13.4)
RBC # BLD: 4.35 M/UL — SIGNIFICANT CHANGE UP (ref 3.8–5.2)
RBC # FLD: 13.1 % — SIGNIFICANT CHANGE UP (ref 10.3–14.5)
SODIUM SERPL-SCNC: 138 MMOL/L — SIGNIFICANT CHANGE UP (ref 135–145)
WBC # BLD: 6.05 K/UL — SIGNIFICANT CHANGE UP (ref 3.8–10.5)
WBC # FLD AUTO: 6.05 K/UL — SIGNIFICANT CHANGE UP (ref 3.8–10.5)

## 2023-03-21 PROCEDURE — G0463: CPT

## 2023-03-21 RX ORDER — DICLOFENAC SODIUM 75 MG/1
0 TABLET, DELAYED RELEASE ORAL
Qty: 0 | Refills: 0 | DISCHARGE

## 2023-03-21 RX ORDER — ACETAMINOPHEN 500 MG
0 TABLET ORAL
Qty: 0 | Refills: 0 | DISCHARGE

## 2023-03-21 RX ORDER — MELOXICAM 15 MG/1
0 TABLET ORAL
Qty: 0 | Refills: 0 | DISCHARGE

## 2023-03-21 RX ORDER — SODIUM CHLORIDE 9 MG/ML
3 INJECTION INTRAMUSCULAR; INTRAVENOUS; SUBCUTANEOUS ONCE
Refills: 0 | Status: DISCONTINUED | OUTPATIENT
Start: 2023-03-22 | End: 2023-04-06

## 2023-03-21 NOTE — H&P PST ADULT - HISTORY OF PRESENT ILLNESS
Pt is a 36-year-old female with no significant  PMH  seen today pre-op for Closed reduction and percutaneous pinning of left femoral neck stress fracture. Pt report severe left hip pain began on 4/1/2022, denies any trauma and fall, report pain is localized to left hip and  intermittent left hip stiffness. Pt report prior conservative treatment of steroid injection, physical therapy without any significant improvement with her pain. Pt states she continues to ambulate with a cane and has significant pain to left hip. Report  she was finally approved for MRI of the left hip which she had on 5/2/2022, MRI of the hip demonstrates a femoral neck stress fracture. Currently on non weight bearing status since 5/2/2022, requires the use of a wheelchair and crutches for ambulation. Seen today for a scheduled procedure on 5/5/2022 with Dr. Kessler   Pt is a 36-year-old female with no H/O Osteoporosis , Hip Fracture S/P ORIF, DVT post surgery here for PSt states that she had a missed ,she was almose 8 weeks of gestation and the sonogram done yesterday didnt show any Heart beat, she us shceduled for a D& C  37-year-old female L1 with no H/O Osteoporosis , Hip Fracture S/P ORIF, DVT post surgery in  here for PST states that she had a missed , she was almost 8 weeks of gestation and the sonogram done yesterday didn't show any Heart beat, she us scheduled for a D& C  with suction by Dr. Cueto on 3/22/23. Covid vaccine series completed, card in chart, No  covid test needed.

## 2023-03-21 NOTE — H&P PST ADULT - PROBLEM SELECTOR PLAN 4
D& C  with suction by Dr. Cueto on 3/22/23. Covid vaccine series completed, card in chart, No  covid test needed.

## 2023-03-21 NOTE — H&P PST ADULT - ASSESSMENT
CAPRINI VTE 2.0 SCORE [CLOT updated 2019]    AGE RELATED RISK FACTORS                                                       MOBILITY RELATED FACTORS  [ ] Age 41-60 years                                            (1 Point)                    [ ] Bed rest                                                        (1 Point)  [ ] Age: 61-74 years                                           (2 Points)                  [ ] Plaster cast                                                   (2 Points)  [ ] Age= 75 years                                              (3 Points)                    [ ] Bed bound for more than 72 hours                 (2 Points)    DISEASE RELATED RISK FACTORS                                               GENDER SPECIFIC FACTORS  [ ] Edema in the lower extremities                       (1 Point)              [ ] Pregnancy                                                     (1 Point)  [ ] Varicose veins                                               (1 Point)                     [ ] Post-partum < 6 weeks                                   (1 Point)             [ ] BMI > 25 Kg/m2                                            (1 Point)                     [ ] Hormonal therapy  or oral contraception          (1 Point)                 [ ] Sepsis (in the previous month)                        (1 Point)               [ ] History of pregnancy complications                 (1 point)  [ ] Pneumonia or serious lung disease                                               [ ] Unexplained or recurrent                     (1 Point)           (in the previous month)                               (1 Point)  [ ] Abnormal pulmonary function test                     (1 Point)                 SURGERY RELATED RISK FACTORS  [ ] Acute myocardial infarction                              (1 Point)               [ ]  Section                                             (1 Point)  [ ] Congestive heart failure (in the previous month)  (1 Point)      [ ] Minor surgery                                                  (1 Point)   [ ] Inflammatory bowel disease                             (1 Point)               [ ] Arthroscopic surgery                                        (2 Points)  [ ] Central venous access                                      (2 Points)                [ ] General surgery lasting more than 45 minutes (2 points)  [ ] Malignancy- Present or previous                   (2 Points)                [ ] Elective arthroplasty                                         (5 points)    [ ] Stroke (in the previous month)                          (5 Points)                                                                                                                                                           HEMATOLOGY RELATED FACTORS                                                 TRAUMA RELATED RISK FACTORS  [ ] Prior episodes of VTE                                     (3 Points)                [ ] Fracture of the hip, pelvis, or leg                       (5 Points)  [ ] Positive family history for VTE                         (3 Points)             [ ] Acute spinal cord injury (in the previous month)  (5 Points)  [ ] Prothrombin 30616 A                                     (3 Points)               [ ] Paralysis  (less than 1 month)                             (5 Points)  [ ] Factor V Leiden                                             (3 Points)                  [ ] Multiple Trauma within 1 month                        (5 Points)  [ ] Lupus anticoagulants                                     (3 Points)                                                           [ ] Anticardiolipin antibodies                               (3 Points)                                                       [ ] High homocysteine in the blood                      (3 Points)                                             [ ] Other congenital or acquired thrombophilia      (3 Points)                                                [ ] Heparin induced thrombocytopenia                  (3 Points)                                     Total Score [          ]  OPIOID RISK TOOL    RICARDO EACH BOX THAT APPLIES AND ADD TOTALS AT THE END    FAMILY HISTORY OF SUBSTANCE ABUSE                 FEMALE         MALE                                                Alcohol                             [  ]1 pt          [  ]3pts                                               Illegal Drugs                     [  ]2 pts        [  ]3pts                                               Rx Drugs                           [  ]4 pts        [  ]4 pts    PERSONAL HISTORY OF SUBSTANCE ABUSE                                                                                          Alcohol                             [  ]3 pts       [  ]3 pts                                               Illegal Drugs                     [  ]4 pts        [  ]4 pts                                               Rx Drugs                           [  ]5 pts        [  ]5 pts    AGE BETWEEN 16-45 YEARS                                      [  ]1 pt         [  ]1 pt    HISTORY OF PREADOLESCENT   SEXUAL ABUSE                                                             [  ]3 pts        [  ]0pts    PSYCHOLOGICAL DISEASE                     ADD, OCD, Bipolar, Schizophrenia        [  ]2 pts         [  ]2 pts                      Depression                                               [  ]1 pt           [  ]1 pt           SCORING TOTAL   (add numbers and type here)              (***)                                     A score of 3 or lower indicated LOW risk for future opioid abuse  A score of 4 to 7 indicated moderate risk for future opioid abuse  A score of 8 or higher indicates a high risk for opioid abuse 37-year-old female L1 with no H/O Osteoporosis , Hip Fracture S/P ORIF, DVT post surgery in  here for PST states that she had a missed , she was almost 8 weeks of gestation and the sonogram done yesterday didn't show any Heart beat, she us scheduled for a D& C  with suction by Dr. Cueto on 3/22/23. Covid vaccine series completed, card in chart, No  covid test needed.     medications reviewed, instructions given on what medications to take and what not to take. she is only on Calcium and prenatal that she can continue till tomorrow, No meds to be taken in the AM of Surgery, ERP teaching given and the pt verbalized understanding. pt is not taking ASA/Plavix/Anticoagulation medication at this time. Asked the pt not to take any NSAID's 5-7 days before surgery and told the pt Tylenol is okay to take for pain, pt verbalized understanding.     CAPRINI VTE 2.0 SCORE [CLOT updated 2019]    AGE RELATED RISK FACTORS                                                       MOBILITY RELATED FACTORS  [ ] Age 41-60 years                                            (1 Point)                    [ ] Bed rest                                                        (1 Point)  [ ] Age: 61-74 years                                           (2 Points)                  [ ] Plaster cast                                                   (2 Points)  [ ] Age= 75 years                                              (3 Points)                    [ ] Bed bound for more than 72 hours                 (2 Points)    DISEASE RELATED RISK FACTORS                                               GENDER SPECIFIC FACTORS  [ ] Edema in the lower extremities                       (1 Point)              [x ] Pregnancy                                                     (1 Point)  [ ] Varicose veins                                               (1 Point)                     [ ] Post-partum < 6 weeks                                   (1 Point)             [ ] BMI > 25 Kg/m2                                            (1 Point)                     [ ] Hormonal therapy  or oral contraception          (1 Point)                 [ ] Sepsis (in the previous month)                        (1 Point)               [ ] History of pregnancy complications                 (1 point)  [ ] Pneumonia or serious lung disease                                               [ ] Unexplained or recurrent                     (1 Point)           (in the previous month)                               (1 Point)  [ ] Abnormal pulmonary function test                     (1 Point)                 SURGERY RELATED RISK FACTORS  [ ] Acute myocardial infarction                              (1 Point)               [ ]  Section                                             (1 Point)  [ ] Congestive heart failure (in the previous month)  (1 Point)      [ ] Minor surgery                                                  (1 Point)   [ ] Inflammatory bowel disease                             (1 Point)               [ ] Arthroscopic surgery                                        (2 Points)  [ ] Central venous access                                      (2 Points)                [ ] General surgery lasting more than 45 minutes (2 points)  [ ] Malignancy- Present or previous                   (2 Points)                [ ] Elective arthroplasty                                         (5 points)    [ ] Stroke (in the previous month)                          (5 Points)                                                                                                                                                           HEMATOLOGY RELATED FACTORS                                                 TRAUMA RELATED RISK FACTORS  [x ] Prior episodes of VTE                                     (3 Points)                [ ] Fracture of the hip, pelvis, or leg                       (5 Points)  [ ] Positive family history for VTE                         (3 Points)             [ ] Acute spinal cord injury (in the previous month)  (5 Points)  [ ] Prothrombin 93308 A                                     (3 Points)               [ ] Paralysis  (less than 1 month)                             (5 Points)  [ ] Factor V Leiden                                             (3 Points)                  [ ] Multiple Trauma within 1 month                        (5 Points)  [ ] Lupus anticoagulants                                     (3 Points)                                                           [ ] Anticardiolipin antibodies                               (3 Points)                                                       [ ] High homocysteine in the blood                      (3 Points)                                             [ ] Other congenital or acquired thrombophilia      (3 Points)                                                [ ] Heparin induced thrombocytopenia                  (3 Points)                                     Total Score [     4     ]  OPIOID RISK TOOL    RICARDO EACH BOX THAT APPLIES AND ADD TOTALS AT THE END    FAMILY HISTORY OF SUBSTANCE ABUSE                 FEMALE         MALE                                                Alcohol                             [  ]1 pt          [  ]3pts                                               Illegal Drugs                     [  ]2 pts        [  ]3pts                                               Rx Drugs                           [  ]4 pts        [  ]4 pts    PERSONAL HISTORY OF SUBSTANCE ABUSE                                                                                          Alcohol                             [  ]3 pts       [  ]3 pts                                               Illegal Drugs                     [  ]4 pts        [  ]4 pts                                               Rx Drugs                           [  ]5 pts        [  ]5 pts    AGE BETWEEN 16-45 YEARS                                      [x ]1 pt         [  ]1 pt    HISTORY OF PREADOLESCENT   SEXUAL ABUSE                                                             [  ]3 pts        [  ]0pts    PSYCHOLOGICAL DISEASE                     ADD, OCD, Bipolar, Schizophrenia        [  ]2 pts         [  ]2 pts                      Depression                                               [  ]1 pt           [  ]1 pt           SCORING TOTAL   (add numbers and type here)              ( 0)                                     A score of 3 or lower indicated LOW risk for future opioid abuse  A score of 4 to 7 indicated moderate risk for future opioid abuse  A score of 8 or higher indicates a high risk for opioid abuse

## 2023-03-21 NOTE — H&P PST ADULT - NSICDXPASTSURGICALHX_GEN_ALL_CORE_FT
PAST SURGICAL HISTORY:  H/O wisdom tooth extraction      PAST SURGICAL HISTORY:  H/O wisdom tooth extraction     S/P ORIF (open reduction internal fixation) fracture

## 2023-03-21 NOTE — H&P PST ADULT - PROBLEM SELECTOR PLAN 2
patient had DVT after a left hip ORIF in 2022, hematology work up normal, it was provoked by the surgery, patient was on BCP at the time of surgery.

## 2023-03-21 NOTE — H&P PST ADULT - NSICDXPASTMEDICALHX_GEN_ALL_CORE_FT
PAST MEDICAL HISTORY:  Left hip pain     Stress fracture left hip     PAST MEDICAL HISTORY:  DVT, lower extremity     H/O osteoporosis     Stress fracture left hip

## 2023-03-21 NOTE — H&P PST ADULT - NSANTHOSAYNRD_GEN_A_CORE
Normal for race
No. SHELBY screening performed.  STOP BANG Legend: 0-2 = LOW Risk; 3-4 = INTERMEDIATE Risk; 5-8 = HIGH Risk

## 2023-03-22 ENCOUNTER — APPOINTMENT (OUTPATIENT)
Dept: OBGYN | Facility: HOSPITAL | Age: 38
End: 2023-03-22

## 2023-03-22 ENCOUNTER — TRANSCRIPTION ENCOUNTER (OUTPATIENT)
Age: 38
End: 2023-03-22

## 2023-03-22 ENCOUNTER — OUTPATIENT (OUTPATIENT)
Dept: INPATIENT UNIT | Facility: HOSPITAL | Age: 38
LOS: 1 days | End: 2023-03-22
Payer: COMMERCIAL

## 2023-03-22 ENCOUNTER — RESULT REVIEW (OUTPATIENT)
Age: 38
End: 2023-03-22

## 2023-03-22 VITALS
OXYGEN SATURATION: 100 % | SYSTOLIC BLOOD PRESSURE: 118 MMHG | TEMPERATURE: 99 F | RESPIRATION RATE: 16 BRPM | DIASTOLIC BLOOD PRESSURE: 77 MMHG | HEIGHT: 72 IN | WEIGHT: 130.07 LBS | HEART RATE: 82 BPM

## 2023-03-22 VITALS
TEMPERATURE: 97 F | OXYGEN SATURATION: 100 % | DIASTOLIC BLOOD PRESSURE: 78 MMHG | HEART RATE: 76 BPM | SYSTOLIC BLOOD PRESSURE: 110 MMHG | RESPIRATION RATE: 20 BRPM

## 2023-03-22 DIAGNOSIS — K08.409 PARTIAL LOSS OF TEETH, UNSPECIFIED CAUSE, UNSPECIFIED CLASS: Chronic | ICD-10-CM

## 2023-03-22 DIAGNOSIS — Z98.890 OTHER SPECIFIED POSTPROCEDURAL STATES: Chronic | ICD-10-CM

## 2023-03-22 DIAGNOSIS — O02.1 MISSED ABORTION: ICD-10-CM

## 2023-03-22 PROCEDURE — 88305 TISSUE EXAM BY PATHOLOGIST: CPT | Mod: 26

## 2023-03-22 PROCEDURE — 59820 CARE OF MISCARRIAGE: CPT

## 2023-03-22 PROCEDURE — 36415 COLL VENOUS BLD VENIPUNCTURE: CPT

## 2023-03-22 PROCEDURE — 88305 TISSUE EXAM BY PATHOLOGIST: CPT

## 2023-03-22 RX ORDER — SODIUM CHLORIDE 9 MG/ML
1000 INJECTION, SOLUTION INTRAVENOUS
Refills: 0 | Status: DISCONTINUED | OUTPATIENT
Start: 2023-03-22 | End: 2023-03-23

## 2023-03-22 RX ORDER — ONDANSETRON 8 MG/1
4 TABLET, FILM COATED ORAL ONCE
Refills: 0 | Status: DISCONTINUED | OUTPATIENT
Start: 2023-03-22 | End: 2023-03-23

## 2023-03-22 RX ORDER — ASPIRIN/CALCIUM CARB/MAGNESIUM 324 MG
1 TABLET ORAL
Qty: 14 | Refills: 0
Start: 2023-03-22 | End: 2023-04-04

## 2023-03-22 RX ORDER — CELECOXIB 200 MG/1
400 CAPSULE ORAL ONCE
Refills: 0 | Status: COMPLETED | OUTPATIENT
Start: 2023-03-22 | End: 2023-03-22

## 2023-03-22 RX ORDER — ENOXAPARIN SODIUM 100 MG/ML
40 INJECTION SUBCUTANEOUS ONCE
Refills: 0 | Status: COMPLETED | OUTPATIENT
Start: 2023-03-22 | End: 2023-03-22

## 2023-03-22 RX ORDER — ENOXAPARIN SODIUM 100 MG/ML
40 INJECTION SUBCUTANEOUS
Qty: 1400 | Refills: 0
Start: 2023-03-22 | End: 2023-04-04

## 2023-03-22 RX ORDER — KETOROLAC TROMETHAMINE 30 MG/ML
30 SYRINGE (ML) INJECTION ONCE
Refills: 0 | Status: DISCONTINUED | OUTPATIENT
Start: 2023-03-22 | End: 2023-03-23

## 2023-03-22 RX ORDER — ACETAMINOPHEN 500 MG
975 TABLET ORAL ONCE
Refills: 0 | Status: COMPLETED | OUTPATIENT
Start: 2023-03-22 | End: 2023-03-22

## 2023-03-22 RX ORDER — FENTANYL CITRATE 50 UG/ML
25 INJECTION INTRAVENOUS
Refills: 0 | Status: DISCONTINUED | OUTPATIENT
Start: 2023-03-22 | End: 2023-03-23

## 2023-03-22 RX ADMIN — ENOXAPARIN SODIUM 40 MILLIGRAM(S): 100 INJECTION SUBCUTANEOUS at 16:40

## 2023-03-22 RX ADMIN — Medication 975 MILLIGRAM(S): at 13:28

## 2023-03-22 RX ADMIN — CELECOXIB 400 MILLIGRAM(S): 200 CAPSULE ORAL at 13:28

## 2023-03-22 NOTE — ASU DISCHARGE PLAN (ADULT/PEDIATRIC) - ASU DC SPECIAL INSTRUCTIONSFT
- Please contact the office for any pain uncontrolled by medication, excessive bleeding or Fever>100.4  - Please call the office for a follow-up with your doctor in 2 weeks  - You can take ibuprofen 600mg every 6 hours and tylenol 975mg every 6 hours as needed for pain. - Please contact the office for any pain uncontrolled by medication, excessive bleeding or Fever>100.4  - Please call the office for a follow-up with your doctor in 2 weeks  - You can take ibuprofen 600mg every 6 hours and tylenol 975mg every 6 hours as needed for pain.  - Monsels was used to help stop bleeding at the cervix after the procedure. This may result in "coffee-ground" brown discharge for the next several days which is to be expected and should resolve.

## 2023-03-22 NOTE — PRE-OP CHECKLIST - ALLERGY BAND ON
Patient appears to be resting comfortably in bed. Vital Signs within normal 
limits. Respirations even and unlabored. PERINEAL CARE PERFORMED AND BED LINEN 
CHANGED. PATIENT POSITIONED FOR COMFORT. PATIENT TOLERATED WELL. no known allergies

## 2023-03-22 NOTE — BRIEF OPERATIVE NOTE - NSICDXBRIEFPROCEDURE_GEN_ALL_CORE_FT
PROCEDURES:  Dilation and curettage, uterus, using suction, with US guidance 22-Mar-2023 15:29:33  Comfort Vigil

## 2023-03-22 NOTE — BRIEF OPERATIVE NOTE - COMMENTS
straight catheter: 100cc    lovenox postop for hx of VTE after a prior surgery; aspirin for 2wks to pharmacy  Rhogam postop in pacu  dictation #: [id number]

## 2023-03-22 NOTE — ASU DISCHARGE PLAN (ADULT/PEDIATRIC) - NS MD DC FALL RISK RISK
For information on Fall & Injury Prevention, visit: [email or web address] OR  [email or web address] OR  https://www.cdc.gov/steadi/patient.html

## 2023-03-22 NOTE — BRIEF OPERATIVE NOTE - OPERATION/FINDINGS
uterus, anteverted, approx 7wk size  mobile, no adnexal masses  cervix not dilated upon speculum exam  ultrasound-guided suction curettage with evacuation of pregnancy noted  belen tenaculum site hemostatic   9'oclock cervical laceration noted, repaired with 2-0 vicryl  hemostasis facilitated with monsels

## 2023-03-22 NOTE — ASU DISCHARGE PLAN (ADULT/PEDIATRIC) - CARE PROVIDER_API CALL
Chyna Prince)  Obstetrics and Gynecology  [address], [facility] [address] [place] [address]  Phone: [phone]  Fax: [phone]  Established Patient  Follow Up Time: 2 weeks

## 2023-03-24 RX ORDER — RIVAROXABAN 20 MG/1
20 TABLET, FILM COATED ORAL
Qty: 14 | Refills: 0 | Status: ACTIVE | COMMUNITY
Start: 2023-03-24 | End: 1900-01-01

## 2023-03-26 PROBLEM — Z87.39 PERSONAL HISTORY OF OTHER DISEASES OF THE MUSCULOSKELETAL SYSTEM AND CONNECTIVE TISSUE: Chronic | Status: ACTIVE | Noted: 2023-03-21

## 2023-03-26 PROBLEM — I82.409 ACUTE EMBOLISM AND THROMBOSIS OF UNSPECIFIED DEEP VEINS OF UNSPECIFIED LOWER EXTREMITY: Chronic | Status: ACTIVE | Noted: 2023-03-21

## 2023-03-27 ENCOUNTER — ASOB RESULT (OUTPATIENT)
Age: 38
End: 2023-03-27

## 2023-03-27 ENCOUNTER — APPOINTMENT (OUTPATIENT)
Dept: ANTEPARTUM | Facility: CLINIC | Age: 38
End: 2023-03-27
Payer: COMMERCIAL

## 2023-03-27 ENCOUNTER — APPOINTMENT (OUTPATIENT)
Dept: OBGYN | Facility: CLINIC | Age: 38
End: 2023-03-27
Payer: COMMERCIAL

## 2023-03-27 VITALS
SYSTOLIC BLOOD PRESSURE: 103 MMHG | HEIGHT: 70 IN | BODY MASS INDEX: 19.61 KG/M2 | WEIGHT: 137 LBS | DIASTOLIC BLOOD PRESSURE: 71 MMHG

## 2023-03-27 PROCEDURE — 76856 US EXAM PELVIC COMPLETE: CPT | Mod: 59

## 2023-03-27 PROCEDURE — 58100 BIOPSY OF UTERUS LINING: CPT | Mod: 78

## 2023-03-27 PROCEDURE — 76830 TRANSVAGINAL US NON-OB: CPT

## 2023-03-27 PROCEDURE — 99214 OFFICE O/P EST MOD 30 MIN: CPT | Mod: 25

## 2023-03-27 NOTE — HISTORY OF PRESENT ILLNESS
[FreeTextEntry1] : 36 yo p1011 had dvc w us guidance last wednesday, yesterday passed several large, dark red gelatinous tissue masses. US today shows thickened endometrial lining 1.9cm, poss cw retained poc on preliminary reading. \par pt is on zarelto blood thinner for hx of dvt after orthopedic surgery, had negative hematologic workup for clotting disorder. Reports after 2-4 pm yesterday, she has not had any further passage of clots, and reports mild cramping. \par \par

## 2023-03-27 NOTE — PHYSICAL EXAM
[Labia Majora] : normal [Labia Minora] : normal [Normal] : normal [Tenderness] : nontender [Retroversion] : retroverted [Enlarged ___ wks] : enlarged [unfilled] ~Uweeks [Uterine Adnexae] : normal

## 2023-03-27 NOTE — PROCEDURE
[Endometrial Biopsy] : Endometrial biopsy [Time out performed] : Pre-procedure time out performed.  Patient's name, date of birth and procedure confirmed. [Consent Obtained] : Consent obtained [Abnormal Pap] : abnormal Pap smear [Risks] : risks [Benefits] : benefits [Alternatives] : alternatives [Patient] : patient [Infection] : infection [Bleeding] : bleeding [Allergic Reaction] : allergic reaction [Uterine Perforation] : uterine perforation [Pain] : pain [Negative] : negative pregnancy test [Betadine] : Betadine [None] : none [Tenaculum] : Tenaculum [Sounded to ___ cm] : sounded to [unfilled] ~Ucm [Anteverted] : anteverted [Specimen Collected] : collected [Sent to Pathology] : placed in buffered formalin and sent for pathology [Tolerated Well] : Patient tolerated the procedure well [No Complications] : No complications

## 2023-03-27 NOTE — PLAN
[FreeTextEntry1] : poss retained poc. \par endo bx performed. sent for path. \par check serial betas. \par dw pt liklihood of passage of tissue, call w serial beta results.\par offered cytotec, pt declines, already feels crampy. \par

## 2023-03-28 ENCOUNTER — LABORATORY RESULT (OUTPATIENT)
Age: 38
End: 2023-03-28

## 2023-03-28 LAB — SURGICAL PATHOLOGY STUDY: SIGNIFICANT CHANGE UP

## 2023-04-05 LAB — CORE LAB BIOPSY: NORMAL

## 2023-04-20 ENCOUNTER — APPOINTMENT (OUTPATIENT)
Dept: OBGYN | Facility: CLINIC | Age: 38
End: 2023-04-20
Payer: COMMERCIAL

## 2023-04-20 VITALS
SYSTOLIC BLOOD PRESSURE: 132 MMHG | DIASTOLIC BLOOD PRESSURE: 87 MMHG | BODY MASS INDEX: 20.04 KG/M2 | HEIGHT: 70 IN | WEIGHT: 140 LBS

## 2023-04-20 DIAGNOSIS — N93.8 OTHER SPECIFIED ABNORMAL UTERINE AND VAGINAL BLEEDING: ICD-10-CM

## 2023-04-20 PROCEDURE — 99024 POSTOP FOLLOW-UP VISIT: CPT

## 2023-04-21 PROBLEM — N93.8 DUB (DYSFUNCTIONAL UTERINE BLEEDING): Status: ACTIVE | Noted: 2023-03-27

## 2023-04-21 NOTE — PLAN
[FreeTextEntry1] : offered trial of antibiotics, pt prefers to avoid if possible. feeling better, denies pain, fever. fu 1 mo or orn.

## 2023-04-21 NOTE — HISTORY OF PRESENT ILLNESS
[FreeTextEntry1] : pt having ongoing scant vb since dvc for missed ab 4 weeks ago. \par endo bx showed only blood and endocervical tissue, us showed poss fragments of retained poc. \par surgicl path showed poc. \par

## 2023-04-28 ENCOUNTER — APPOINTMENT (OUTPATIENT)
Dept: ORTHOPEDIC SURGERY | Facility: CLINIC | Age: 38
End: 2023-04-28
Payer: COMMERCIAL

## 2023-04-28 VITALS
BODY MASS INDEX: 20.04 KG/M2 | HEIGHT: 70 IN | HEART RATE: 91 BPM | SYSTOLIC BLOOD PRESSURE: 104 MMHG | DIASTOLIC BLOOD PRESSURE: 70 MMHG | WEIGHT: 140 LBS

## 2023-04-28 DIAGNOSIS — M25.552 PAIN IN LEFT HIP: ICD-10-CM

## 2023-04-28 PROCEDURE — 99213 OFFICE O/P EST LOW 20 MIN: CPT

## 2023-04-28 PROCEDURE — 73502 X-RAY EXAM HIP UNI 2-3 VIEWS: CPT

## 2023-04-28 NOTE — HISTORY OF PRESENT ILLNESS
[___ Months Post Op] : [unfilled] months post op [Xray (Date:___)] : [unfilled] Xray -  [de-identified] : DOS: 05/05/2022\par s/p closed reduction and percutaneous pinning of a left femoral neck stress fracture. [de-identified] : Rubina is a 37-year-old female s/p closed reduction and percutaneous pinning of a left femoral neck stress fracture.  The patient states that she is doing very well and has no pain at this point.  She occasionally gets mild discomfort if she lays on her left side for prolonged period but other than that she is feeling very good.  She has returned to all of her normal activities as tolerated.  She is pleased with her outcome.  She is here for routine follow-up.  The patient denies any fevers, chills, sweats, recent illnesses, numbness, tingling, weakness, or pain elsewhere at this time. [de-identified] : On exam, the patient is pleasant.  They  are awake, alert, and oriented x3.  The patient presents ambulating without any assistive devices.\par Weight is appropriate for height\par Full range of motion of cervical spine without instability\par Full range of motion of back without instability\par Intact neurologic, vascular, and dermatologic exam to right and left upper extremities\par Intact neurologic, vascular, and dermatologic exam to right and left lower extremities.\par \par Left lower Extremity\par The patient's incision is well healed.  No erythema about the incision.  No warmth, drainage, or palpable fluctuance.  There is no postoperative swelling about the left hip.  Minimal periincisional tenderness.  Range of motion: Full range of motion in all planes pain-free, negative logroll test and can straight leg raise against gravity.  EHL/FHL/TA/GSC motor function is intact, sensations intact light touch in L2-S1 distributions, DP/PT pulses are palpable, compartments are soft and compressible.\par  [de-identified] : X-rays including 2 views of the left hip were obtained in the office today on 4/28/2023 and reviewed the patient.  The patient is status post closed reduction and percutaneous pinning of her left femoral neck stress fracture.  The hardware remains in good position without any evidence of loosening or cut out.  The patient's femoral neck stress fracture is completely healed. [de-identified] : 37-year-old female status post closed reduction and percutaneous pinning of a left femoral neck stress fracture on 5/5/2022 [de-identified] : Rubina has recovered well from her hip surgery.  She has full range of motion and no pain.  She may continue with all of her normal activities as tolerated without restriction.  At this point she may follow-up on an as-needed basis.  She verbalizes and agrees all questions were answered to her satisfaction.

## 2023-05-16 ENCOUNTER — APPOINTMENT (OUTPATIENT)
Dept: OBGYN | Facility: CLINIC | Age: 38
End: 2023-05-16
Payer: COMMERCIAL

## 2023-05-16 VITALS
WEIGHT: 140 LBS | BODY MASS INDEX: 20.04 KG/M2 | SYSTOLIC BLOOD PRESSURE: 110 MMHG | DIASTOLIC BLOOD PRESSURE: 70 MMHG | HEIGHT: 70 IN

## 2023-05-16 PROCEDURE — 99214 OFFICE O/P EST MOD 30 MIN: CPT | Mod: 24

## 2023-05-17 NOTE — PLAN
[FreeTextEntry1] : dw pt typical return of menses after dvc, rba advanced maternal age in pregnancy, miscarriage rate, consideration of progesterone tx in early pregnancy for miscarriage prevention \par check beta, prog, tsh levels. \par spent 30 min in encounter

## 2023-05-17 NOTE — HISTORY OF PRESENT ILLNESS
[FreeTextEntry1] : 36 yo pt here for fu after dvg for missed ab, had endo bx for heavy vb and poss retained poc, path negative on bx, abnl vb resolved. \par wondering now about next mp, now 8 weeks sp dvc. last beta 1600

## 2023-05-25 ENCOUNTER — LABORATORY RESULT (OUTPATIENT)
Age: 38
End: 2023-05-25

## 2023-08-14 ENCOUNTER — APPOINTMENT (OUTPATIENT)
Dept: OBGYN | Facility: CLINIC | Age: 38
End: 2023-08-14
Payer: COMMERCIAL

## 2023-08-14 VITALS
DIASTOLIC BLOOD PRESSURE: 76 MMHG | HEIGHT: 70 IN | WEIGHT: 136 LBS | SYSTOLIC BLOOD PRESSURE: 114 MMHG | BODY MASS INDEX: 19.47 KG/M2

## 2023-08-14 DIAGNOSIS — Z01.419 ENCOUNTER FOR GYNECOLOGICAL EXAMINATION (GENERAL) (ROUTINE) W/OUT ABNORMAL FINDINGS: ICD-10-CM

## 2023-08-14 PROCEDURE — 99396 PREV VISIT EST AGE 40-64: CPT

## 2023-08-14 NOTE — HISTORY OF PRESENT ILLNESS
[FreeTextEntry1] : 37 yo p1011 here for annuytaylorxam. periods monthly, trying to conceive, had miscarraige earlier in year.  no co  on pnv

## 2023-08-16 LAB — HPV HIGH+LOW RISK DNA PNL CVX: NOT DETECTED

## 2023-08-17 LAB — CYTOLOGY CVX/VAG DOC THIN PREP: NORMAL

## 2023-10-10 ENCOUNTER — APPOINTMENT (OUTPATIENT)
Dept: OBGYN | Facility: CLINIC | Age: 38
End: 2023-10-10
Payer: COMMERCIAL

## 2023-10-10 VITALS — BODY MASS INDEX: 19.47 KG/M2 | HEIGHT: 70 IN | WEIGHT: 136 LBS

## 2023-10-10 DIAGNOSIS — Z34.90 ENCOUNTER FOR SUPERVISION OF NORMAL PREGNANCY, UNSPECIFIED, UNSPECIFIED TRIMESTER: ICD-10-CM

## 2023-10-10 DIAGNOSIS — N91.1 SECONDARY AMENORRHEA: ICD-10-CM

## 2023-10-10 LAB
HCG UR QL: POSITIVE
QUALITY CONTROL: YES

## 2023-10-10 PROCEDURE — 81025 URINE PREGNANCY TEST: CPT

## 2023-10-10 PROCEDURE — 99213 OFFICE O/P EST LOW 20 MIN: CPT

## 2023-10-10 PROCEDURE — 76817 TRANSVAGINAL US OBSTETRIC: CPT

## 2023-10-11 PROBLEM — Z34.90 PREGNANCY: Status: ACTIVE | Noted: 2023-03-07

## 2023-10-11 PROBLEM — N91.1 AMENORRHEA, SECONDARY: Status: ACTIVE | Noted: 2023-03-07

## 2023-10-11 RX ORDER — PROGESTERONE 200 MG/1
200 CAPSULE ORAL
Qty: 60 | Refills: 2 | Status: ACTIVE | COMMUNITY
Start: 2023-10-11 | End: 1900-01-01

## 2023-10-13 ENCOUNTER — APPOINTMENT (OUTPATIENT)
Dept: OBGYN | Facility: CLINIC | Age: 38
End: 2023-10-13
Payer: COMMERCIAL

## 2023-10-13 VITALS
SYSTOLIC BLOOD PRESSURE: 118 MMHG | WEIGHT: 135 LBS | BODY MASS INDEX: 19.33 KG/M2 | DIASTOLIC BLOOD PRESSURE: 75 MMHG | HEIGHT: 70 IN

## 2023-10-13 PROCEDURE — 76815 OB US LIMITED FETUS(S): CPT

## 2023-10-13 PROCEDURE — 99214 OFFICE O/P EST MOD 30 MIN: CPT

## 2023-10-19 LAB — CORE LAB BIOPSY: NORMAL

## 2023-10-23 ENCOUNTER — APPOINTMENT (OUTPATIENT)
Dept: ANTEPARTUM | Facility: CLINIC | Age: 38
End: 2023-10-23

## 2023-10-24 ENCOUNTER — APPOINTMENT (OUTPATIENT)
Dept: OBGYN | Facility: CLINIC | Age: 38
End: 2023-10-24
Payer: COMMERCIAL

## 2023-10-24 VITALS
SYSTOLIC BLOOD PRESSURE: 120 MMHG | WEIGHT: 135 LBS | BODY MASS INDEX: 19.33 KG/M2 | HEIGHT: 70 IN | DIASTOLIC BLOOD PRESSURE: 70 MMHG

## 2023-10-24 PROCEDURE — 99214 OFFICE O/P EST MOD 30 MIN: CPT

## 2023-10-25 LAB — HCG SERPL-MCNC: 32 MIU/ML

## 2024-02-04 NOTE — H&P PST ADULT - RESPIRATORY RATE (BREATHS/MIN)
MD Notification    Notified Person: MD    Notified Person Name: Buster     Notification Date/Time: 0120 2/4/24    Notification Interaction: Vocera     Purpose of Notification:   Comfort care pt experiencing air hunger. No IV access. Gave oral morphine - not effective. Can we get something else to help? Or increase frequency? He is on 3-4 L nasal canula, was previously on oxymask but wouldnt keep it on.     Orders Received: Increase to 6 L O2, duonebs ordered, increased morphine frequency     Comments:    16

## 2024-02-20 DIAGNOSIS — Z87.59 PERSONAL HISTORY OF OTHER COMPLICATIONS OF PREGNANCY, CHILDBIRTH AND THE PUERPERIUM: ICD-10-CM

## 2024-02-20 RX ORDER — PROGESTERONE 200 MG/1
200 CAPSULE ORAL
Qty: 90 | Refills: 0 | Status: ACTIVE | COMMUNITY
Start: 2024-02-20 | End: 1900-01-01

## 2024-03-04 ENCOUNTER — APPOINTMENT (OUTPATIENT)
Dept: OBGYN | Facility: CLINIC | Age: 39
End: 2024-03-04
Payer: COMMERCIAL

## 2024-03-04 VITALS
DIASTOLIC BLOOD PRESSURE: 68 MMHG | HEIGHT: 70 IN | BODY MASS INDEX: 19.61 KG/M2 | WEIGHT: 137 LBS | SYSTOLIC BLOOD PRESSURE: 106 MMHG

## 2024-03-04 DIAGNOSIS — Z86.718 PERSONAL HISTORY OF OTHER VENOUS THROMBOSIS AND EMBOLISM: ICD-10-CM

## 2024-03-04 DIAGNOSIS — Z32.01 ENCOUNTER FOR PREGNANCY TEST, RESULT POSITIVE: ICD-10-CM

## 2024-03-04 LAB
HCG UR QL: POSITIVE
QUALITY CONTROL: YES

## 2024-03-04 PROCEDURE — 81025 URINE PREGNANCY TEST: CPT

## 2024-03-04 PROCEDURE — 99214 OFFICE O/P EST MOD 30 MIN: CPT

## 2024-03-04 PROCEDURE — 76817 TRANSVAGINAL US OBSTETRIC: CPT

## 2024-03-04 NOTE — DISCUSSION/SUMMARY
[FreeTextEntry1] : I discussed the findings of the sonogram at length with the patient.  I reassured her in general.  She will return in 1 week for repeat sonogram.  In terms of ovarian cyst I discussed high likelihood of spontaneous resolution but small risk of Torsion, hemorrhage etc.  Regarding her history of DVT; once we have established viability of pregnancy I discussed considering MFM eval.  We discussed baby aspirin starting at 12 weeks.

## 2024-03-04 NOTE — HISTORY OF PRESENT ILLNESS
[FreeTextEntry1] : 38-year-old white female  4 para 1-0-2-1 presents with positive UCG.  Her LMP is 2024 giving her an EGA of 5 weeks and 5 days ago EDC of 10/30/2024.  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  She is currently on 100 mg of progesterone nightly.  Patient's pregnancy is complicated by previous history of DVT after a hip fracture and surgery.  She reports no history of thrombophilia.  She currently denies any vaginal bleeding or pain.

## 2024-03-04 NOTE — PROCEDURE
[Transvaginal OB Sonogram] : Transvaginal OB Sonogram [Yolk Sac] : yolk sac present [Intrauterine Pregnancy] : intrauterine pregnancy [Date: ___] : Date: [unfilled] [Transvaginal OB Sonogram WNL] : Transvaginal OB Sonogram WNL [FreeTextEntry1] : + gs, + ys; 3.4 cm simple right ovarian cyst; possible small subchorionic hematoma

## 2024-03-06 ENCOUNTER — EMERGENCY (EMERGENCY)
Facility: HOSPITAL | Age: 39
LOS: 1 days | Discharge: HOME | End: 2024-03-06
Attending: STUDENT IN AN ORGANIZED HEALTH CARE EDUCATION/TRAINING PROGRAM
Payer: COMMERCIAL

## 2024-03-06 VITALS
TEMPERATURE: 99 F | OXYGEN SATURATION: 99 % | HEART RATE: 113 BPM | DIASTOLIC BLOOD PRESSURE: 50 MMHG | RESPIRATION RATE: 16 BRPM | HEIGHT: 70 IN | SYSTOLIC BLOOD PRESSURE: 103 MMHG | WEIGHT: 133.16 LBS

## 2024-03-06 VITALS
SYSTOLIC BLOOD PRESSURE: 111 MMHG | RESPIRATION RATE: 19 BRPM | DIASTOLIC BLOOD PRESSURE: 72 MMHG | TEMPERATURE: 98 F | HEART RATE: 82 BPM | OXYGEN SATURATION: 99 %

## 2024-03-06 DIAGNOSIS — Z98.890 OTHER SPECIFIED POSTPROCEDURAL STATES: Chronic | ICD-10-CM

## 2024-03-06 DIAGNOSIS — K08.409 PARTIAL LOSS OF TEETH, UNSPECIFIED CAUSE, UNSPECIFIED CLASS: Chronic | ICD-10-CM

## 2024-03-06 LAB
ALBUMIN SERPL ELPH-MCNC: 3.7 G/DL — SIGNIFICANT CHANGE UP (ref 3.3–5.2)
ALP SERPL-CCNC: 76 U/L — SIGNIFICANT CHANGE UP (ref 40–120)
ALT FLD-CCNC: 14 U/L — SIGNIFICANT CHANGE UP
ANION GAP SERPL CALC-SCNC: 16 MMOL/L — SIGNIFICANT CHANGE UP (ref 5–17)
APTT BLD: 28.6 SEC — SIGNIFICANT CHANGE UP (ref 24.5–35.6)
AST SERPL-CCNC: 33 U/L — HIGH
BILIRUB SERPL-MCNC: 0.4 MG/DL — SIGNIFICANT CHANGE UP (ref 0.4–2)
BLD GP AB SCN SERPL QL: SIGNIFICANT CHANGE UP
BUN SERPL-MCNC: 7.5 MG/DL — LOW (ref 8–20)
CALCIUM SERPL-MCNC: 9.5 MG/DL — SIGNIFICANT CHANGE UP (ref 8.4–10.5)
CHLORIDE SERPL-SCNC: 97 MMOL/L — SIGNIFICANT CHANGE UP (ref 96–108)
CO2 SERPL-SCNC: 22 MMOL/L — SIGNIFICANT CHANGE UP (ref 22–29)
CREAT SERPL-MCNC: 0.46 MG/DL — LOW (ref 0.5–1.3)
D DIMER BLD IA.RAPID-MCNC: 470 NG/ML DDU — HIGH
EGFR: 124 ML/MIN/1.73M2 — SIGNIFICANT CHANGE UP
GLUCOSE SERPL-MCNC: 83 MG/DL — SIGNIFICANT CHANGE UP (ref 70–99)
HCG SERPL-ACNC: 9234 MIU/ML — HIGH
HCT VFR BLD CALC: 37.4 % — SIGNIFICANT CHANGE UP (ref 34.5–45)
HGB BLD-MCNC: 12.9 G/DL — SIGNIFICANT CHANGE UP (ref 11.5–15.5)
INR BLD: 1.11 RATIO — SIGNIFICANT CHANGE UP (ref 0.85–1.18)
MCHC RBC-ENTMCNC: 31.5 PG — SIGNIFICANT CHANGE UP (ref 27–34)
MCHC RBC-ENTMCNC: 34.5 GM/DL — SIGNIFICANT CHANGE UP (ref 32–36)
MCV RBC AUTO: 91.2 FL — SIGNIFICANT CHANGE UP (ref 80–100)
NT-PROBNP SERPL-SCNC: 74 PG/ML — SIGNIFICANT CHANGE UP (ref 0–300)
PLATELET # BLD AUTO: 271 K/UL — SIGNIFICANT CHANGE UP (ref 150–400)
POTASSIUM SERPL-MCNC: 4.7 MMOL/L — SIGNIFICANT CHANGE UP (ref 3.5–5.3)
POTASSIUM SERPL-SCNC: 4.7 MMOL/L — SIGNIFICANT CHANGE UP (ref 3.5–5.3)
PROT SERPL-MCNC: 7.7 G/DL — SIGNIFICANT CHANGE UP (ref 6.6–8.7)
PROTHROM AB SERPL-ACNC: 12.3 SEC — SIGNIFICANT CHANGE UP (ref 9.5–13)
RAPID RVP RESULT: SIGNIFICANT CHANGE UP
RBC # BLD: 4.1 M/UL — SIGNIFICANT CHANGE UP (ref 3.8–5.2)
RBC # FLD: 11.9 % — SIGNIFICANT CHANGE UP (ref 10.3–14.5)
SARS-COV-2 RNA SPEC QL NAA+PROBE: SIGNIFICANT CHANGE UP
SODIUM SERPL-SCNC: 135 MMOL/L — SIGNIFICANT CHANGE UP (ref 135–145)
TROPONIN T, HIGH SENSITIVITY RESULT: <6 NG/L — SIGNIFICANT CHANGE UP (ref 0–51)
WBC # BLD: 14.44 K/UL — HIGH (ref 3.8–10.5)
WBC # FLD AUTO: 14.44 K/UL — HIGH (ref 3.8–10.5)

## 2024-03-06 PROCEDURE — 85610 PROTHROMBIN TIME: CPT

## 2024-03-06 PROCEDURE — 71046 X-RAY EXAM CHEST 2 VIEWS: CPT | Mod: 26

## 2024-03-06 PROCEDURE — 99285 EMERGENCY DEPT VISIT HI MDM: CPT

## 2024-03-06 PROCEDURE — 85027 COMPLETE CBC AUTOMATED: CPT

## 2024-03-06 PROCEDURE — 99285 EMERGENCY DEPT VISIT HI MDM: CPT | Mod: 25

## 2024-03-06 PROCEDURE — 86901 BLOOD TYPING SEROLOGIC RH(D): CPT

## 2024-03-06 PROCEDURE — 84702 CHORIONIC GONADOTROPIN TEST: CPT

## 2024-03-06 PROCEDURE — 71275 CT ANGIOGRAPHY CHEST: CPT | Mod: MC

## 2024-03-06 PROCEDURE — 71046 X-RAY EXAM CHEST 2 VIEWS: CPT

## 2024-03-06 PROCEDURE — 71275 CT ANGIOGRAPHY CHEST: CPT | Mod: 26,MC

## 2024-03-06 PROCEDURE — 80053 COMPREHEN METABOLIC PANEL: CPT

## 2024-03-06 PROCEDURE — 86900 BLOOD TYPING SEROLOGIC ABO: CPT

## 2024-03-06 PROCEDURE — 0225U NFCT DS DNA&RNA 21 SARSCOV2: CPT

## 2024-03-06 PROCEDURE — 36415 COLL VENOUS BLD VENIPUNCTURE: CPT

## 2024-03-06 PROCEDURE — 85730 THROMBOPLASTIN TIME PARTIAL: CPT

## 2024-03-06 PROCEDURE — 93010 ELECTROCARDIOGRAM REPORT: CPT

## 2024-03-06 PROCEDURE — 86850 RBC ANTIBODY SCREEN: CPT

## 2024-03-06 PROCEDURE — 93005 ELECTROCARDIOGRAM TRACING: CPT

## 2024-03-06 PROCEDURE — 84484 ASSAY OF TROPONIN QUANT: CPT

## 2024-03-06 PROCEDURE — 83880 ASSAY OF NATRIURETIC PEPTIDE: CPT

## 2024-03-06 PROCEDURE — 85379 FIBRIN DEGRADATION QUANT: CPT

## 2024-03-06 RX ORDER — AZITHROMYCIN 500 MG/1
500 TABLET, FILM COATED ORAL ONCE
Refills: 0 | Status: COMPLETED | OUTPATIENT
Start: 2024-03-06 | End: 2024-03-06

## 2024-03-06 RX ORDER — AZITHROMYCIN 500 MG/1
1 TABLET, FILM COATED ORAL
Qty: 4 | Refills: 0
Start: 2024-03-06 | End: 2024-03-09

## 2024-03-06 RX ORDER — SODIUM CHLORIDE 9 MG/ML
1000 INJECTION INTRAMUSCULAR; INTRAVENOUS; SUBCUTANEOUS ONCE
Refills: 0 | Status: COMPLETED | OUTPATIENT
Start: 2024-03-06 | End: 2024-03-06

## 2024-03-06 RX ORDER — AMOXICILLIN 250 MG/5ML
1000 SUSPENSION, RECONSTITUTED, ORAL (ML) ORAL ONCE
Refills: 0 | Status: COMPLETED | OUTPATIENT
Start: 2024-03-06 | End: 2024-03-06

## 2024-03-06 RX ORDER — AMOXICILLIN 250 MG/5ML
2 SUSPENSION, RECONSTITUTED, ORAL (ML) ORAL
Qty: 30 | Refills: 0
Start: 2024-03-06 | End: 2024-03-10

## 2024-03-06 RX ADMIN — AZITHROMYCIN 500 MILLIGRAM(S): 500 TABLET, FILM COATED ORAL at 22:00

## 2024-03-06 RX ADMIN — SODIUM CHLORIDE 1000 MILLILITER(S): 9 INJECTION INTRAMUSCULAR; INTRAVENOUS; SUBCUTANEOUS at 17:17

## 2024-03-06 RX ADMIN — Medication 1000 MILLIGRAM(S): at 22:00

## 2024-03-06 NOTE — ED ADULT NURSE REASSESSMENT NOTE - NS ED NURSE REASSESS COMMENT FT1
patient resting  comfortably on stretcher, NSR on cardiac monitor, no acute distress noted at this time and family at bedside. Patient informed plan of care and states understanding.

## 2024-03-06 NOTE — ED STATDOCS - CLINICAL SUMMARY MEDICAL DECISION MAKING FREE TEXT BOX
HPI: 38-year-old female -0-2-1 at 6 weeks gestation, PMH DVT reported as provoked from hip surgery no longer on AC, presents from her doctor's office for cough and reported episode of hypoxia to 92% on room air to rule out PNA and blood.  Patient endorsing fatigue and states known sick contacts of her  and child with recent viral illnesses.  Patient states over the last several days she has had a low-grade temperature at 99 F and has persistently coughing without production.  No chills, abdominal pain, chest pain.  No pleuritic chest pain.  No medications taken for current symptoms.  No other current complaints this time.    ROS:   General: See HPI  ENT: No earache, no coryza, no sore throat  Neck: No stiffness, no swollen glands, no dysphagia  Respiratory: See HPI  Cardiac: no chest pain, no palpitations, no edema, no jvd  Abdomen: No abdominal pain, no nausea, no vomiting, no diarrhea  Musculoskeletal: No myalgia, no arthralgia  Neurologic: No headache, no vertigo, no paresthesia, no focal deficits  Skin: No rash, no evidence of trauma  All other ROS are negative    PE:  General: NAD;  A&O x3   Head: NC/AT  Eyes: PERRL, EOMI  ENT: Airway patent, mmm  Pulmonary: CTA b/l, symmetric breath sounds. No W/R/R.  Cardiac: s1s2, tachycardic 112 bpm, no M,G,R, No JVD  Abdomen: +BS, ND, NT, soft, no guarding, no rebound, no masses , no rigidity  : No CVA TTP, no suprapubic TTP  Back: Normal  spine  Extremities: FROM, symmetric pulses, capillary refill < 2 seconds, no edema, 5/5 strength in b/l UE and LE  Skin: no rash or bruising  Neurologic: alert, speech clear, no focal deficits  Psych: nl mood/affect, nl insight.    MDM: 38-year-old female -0-2-1 at 6 weeks gestation, PMH DVT reported as provoked from hip surgery no longer on AC, presents from her doctor's office for cough and reported episode of hypoxia to 92% on room air to rule out PNA and blood.  Possible PNA versus viral illness versus PE.  Prolonged conversation with patient regarding benefits versus risks of radiation exposure in the setting of pregnancy.  Patient verbalized agreement understanding and has elected to proceed with chest x-ray as screening for PNA, and initial D-dimer evaluation understanding that it may be elevated and has verbalized agreement to further evaluation with CT PE study showed a fever should it be required in addition to treatment with anticoagulation.  Patient initially evaluated in intake and evaluated to main ED for cardiac monitoring and further evaluation and treatment.  Patient signed out to Dr. Pulido.

## 2024-03-06 NOTE — ED ADULT TRIAGE NOTE - CHIEF COMPLAINT QUOTE
pt is aprox. 6 weeks pregnant c/o cough and fatigue, pt saw PCP today and was told pt was hypoxic during visit and recommended to come to ED for eval.

## 2024-03-06 NOTE — ED ADULT NURSE NOTE - OBJECTIVE STATEMENT
patient presents with complains of cough, weak and hypoxic. patient saw her PCP for the symptoms and was sent to ER.

## 2024-03-06 NOTE — ED ADULT NURSE NOTE - RESPIRATION RHYTHM, QM
regular Alar Island Pedicle Flap Text: The defect edges were debeveled with a #15 scalpel blade.  Given the location of the defect, shape of the defect and the proximity to the alar rim an island pedicle advancement flap was deemed most appropriate.  Using a sterile surgical marker, an appropriate advancement flap was drawn incorporating the defect, outlining the appropriate donor tissue and placing the expected incisions within the nasal ala running parallel to the alar rim. The area thus outlined was incised with a #15 scalpel blade.  The skin margins were undermined minimally to an appropriate distance in all directions around the primary defect and laterally outward around the island pedicle utilizing iris scissors.  There was minimal undermining beneath the pedicle flap.

## 2024-03-06 NOTE — ED STATDOCS - PATIENT PORTAL LINK FT
You can access the FollowMyHealth Patient Portal offered by [facility] by registering at the following website: [email or web address]. By joining [employer]’s FollowMyHealth portal, you will also be able to view your health information using other applications (apps) compatible with our system.

## 2024-03-06 NOTE — ED STATDOCS - NSFOLLOWUPINSTRUCTIONS_ED_ALL_ED_FT
Community Acquired Pneumonia    WHAT YOU NEED TO KNOW:    What is community-acquired pneumonia (CAP)? CAP is a lung infection that you get outside of a hospital or nursing home setting. Your lungs become inflamed and cannot work well. CAP may be caused by bacteria, viruses, or fungi.  The Lungs    What increases my risk for CAP?    Chronic lung disease    Cigarette smoking    Brain disorders such as stroke, dementia, and cerebral palsy    Weakened immune system    Recent surgery or trauma    Surgery for cancer of the mouth, throat, or neck    Medical conditions such as diabetes or heart disease  What are the signs and symptoms of CAP?    Cough that may bring up green, yellow, or bloody mucus    Fever, chills, or severe shaking    Shortness of breath    Breathing and heartbeat that are faster than usual    Pain in your chest or back when you breathe in or cough    Fatigue and loss of appetite    Trouble thinking clearly  How is CAP diagnosed? Your healthcare provider will listen to your lungs. You may need a chest x-ray. You may also need any of the following if you are admitted to the hospital:    CT scan pictures may show a lung infection or other problems, such as fluid around your lungs. You may be given contrast liquid to help your lungs show up better in the pictures. Tell the healthcare provider if you have ever had an allergic reaction to contrast liquid.    A pulse oximeter is a device that measures the amount of oxygen in your blood.  Pulse Oximeter      Blood and sputum tests may be done to check for the germ causing your infection.    Bronchoscopy is a procedure to look inside your airway and learn the cause of your airway or lung condition. A bronchoscope (thin tube with a light) is inserted into your mouth and moved down your throat to your airway. Tissue and fluid may be collected from your airway or lungs to be tested.    Nucleic acid-based testing , also called a PCR test, may be used to check for a virus causing your pneumonia. You may need the test if you have severe CAP or a weakened immune system.  How is CAP treated? Treatment will depend on the type of germ causing your CAP, and how bad your symptoms are. You may need antibiotics for at least 5 days if your pneumonia is caused by bacteria. Antiviral medicines may be given if you have viral pneumonia. You may need medicines that dilate your bronchial tubes. You may need oxygen if your blood oxygen level is lower than it should be. You may need to be admitted to the hospital if your pneumonia is severe.    What can I do to manage CAP?    Get plenty of rest. Rest helps your body heal.    Do not smoke or allow others to smoke around you. Nicotine and other chemicals in cigarettes and cigars can cause lung damage. Ask your healthcare provider for information if you currently smoke and need help to quit. E-cigarettes or smokeless tobacco still contain nicotine. Talk to your healthcare provider before you use these products.    Breathe warm, moist air. This helps loosen mucus. Loosely place a warm, wet washcloth over your nose and mouth. A room humidifier may also help make the air moist.    Gently tap your chest. This helps loosen mucus so it is easier to cough up.    Take deep breaths and cough. Deep breathing helps open the air passages in your lungs. Coughing helps bring up mucus from your lungs. Take a deep breath and hold the breath as long as you can. Then push the air out of your lungs with a deep, strong cough. Spit out any mucus you have coughed up. Take 10 deep breaths in a row every hour that you are awake. Remember to follow each deep breath with a cough.    Drink liquids as directed. Ask your healthcare provider how much liquid to drink each day and which liquids to drink. Liquids help make mucus thin and easier to get out of your body.  How can I prevent CAP?    Wash your hands often. Use soap for at least 20 seconds. Rinse with warm running water. Dry your hands with a clean towel or paper towel. Use hand  that contains alcohol if soap and water are not available. Wash your hands several times each day. Wash after you use the bathroom, change a child's diaper, and before you prepare or eat food. Do not touch your eyes, nose, or mouth without washing your hands first.  Handwashing      Cover a sneeze or cough. Use a tissue that covers your mouth and nose. Throw the tissue away in a trash can right away. Use the bend of your arm if a tissue is not available. Wash your hands well with soap and water or use a hand . Do not stand close to anyone who is sneezing or coughing.    Clean surfaces often. Clean doorknobs, countertops, cell phones, and other surfaces that are touched often. Use a disinfecting wipe, a single-use sponge, or a cloth you can wash and reuse. Use disinfecting  if you do not have wipes. You can create a disinfecting  by mixing 1 part bleach with 10 parts water.    Try to avoid people who have a cold or the flu. If you are sick, stay away from others as much as possible.    Ask about vaccines you may need. A pneumonia vaccine can help lower your risk for pneumonia. The vaccine may be recommended every 5 years, starting at age 65. Vaccines help lower the risk for infections that can become serious for a person who has pneumonia. Get a flu vaccine each year as soon as recommended, usually in September or October. Get a COVID-19 vaccine and booster as directed. Your healthcare provider can tell you if you should also get other vaccines, and when to get them.    When should I seek immediate care?    You are confused and cannot think clearly.    You have increased trouble breathing.    Your lips or fingernails turn gray or blue.  When should I call my doctor?    Your symptoms do not get better, or get worse.    You are urinating less, or not at all.    You have questions or concerns about your condition or care.  CARE AGREEMENT:    You have the right to help plan your care. Learn about your health condition and how it may be treated. Discuss treatment options with your healthcare providers to decide what care you want to receive. You always have the right to refuse treatment.

## 2024-03-06 NOTE — ED STATDOCS - PROGRESS NOTE DETAILS
Yeyo: Pt received in signout from Dr. Pulido. CT reviewed. Will start amoxicillin and azithromycin for CAP. Stable for discharge with return precautions.

## 2024-03-14 ENCOUNTER — APPOINTMENT (OUTPATIENT)
Dept: OBGYN | Facility: CLINIC | Age: 39
End: 2024-03-14
Payer: COMMERCIAL

## 2024-03-14 VITALS
HEIGHT: 70 IN | WEIGHT: 134 LBS | DIASTOLIC BLOOD PRESSURE: 70 MMHG | BODY MASS INDEX: 19.18 KG/M2 | SYSTOLIC BLOOD PRESSURE: 108 MMHG

## 2024-03-14 DIAGNOSIS — Z33.1 PREGNANT STATE, INCIDENTAL: ICD-10-CM

## 2024-03-14 PROCEDURE — 99214 OFFICE O/P EST MOD 30 MIN: CPT

## 2024-03-14 PROCEDURE — 76830 TRANSVAGINAL US NON-OB: CPT

## 2024-03-14 NOTE — DISCUSSION/SUMMARY
[FreeTextEntry1] : I discussed the findings of the sonogram with the patient.  I did advise her of the differential including early IUP with discrepancy in dates; I also discussed some possibility of abnormal intrauterine pregnancy and likelihood of SAB.  She will return in 1 week for repeat sonogram.  All questions were answered.

## 2024-03-14 NOTE — PROCEDURE
[Transvaginal OB Sonogram] : Transvaginal OB Sonogram [Intrauterine Pregnancy] : intrauterine pregnancy [Fetal Heart] : fetal heart present [Date: ___] : Date: [unfilled] [Yolk Sac] : yolk sac present [Current GA by Sonogram: ___ (wks)] : Current GA by Sonogram: [unfilled]Uwks [FreeTextEntry1] : +gs, +ys, + pole, possible fh [___ day(s)] : [unfilled] days

## 2024-03-22 ENCOUNTER — APPOINTMENT (OUTPATIENT)
Dept: ANTEPARTUM | Facility: CLINIC | Age: 39
End: 2024-03-22
Payer: COMMERCIAL

## 2024-03-22 ENCOUNTER — ASOB RESULT (OUTPATIENT)
Age: 39
End: 2024-03-22

## 2024-03-22 ENCOUNTER — APPOINTMENT (OUTPATIENT)
Dept: OBGYN | Facility: CLINIC | Age: 39
End: 2024-03-22
Payer: COMMERCIAL

## 2024-03-22 DIAGNOSIS — O02.1 MISSED ABORTION: ICD-10-CM

## 2024-03-22 PROCEDURE — 76817 TRANSVAGINAL US OBSTETRIC: CPT

## 2024-03-22 PROCEDURE — 99214 OFFICE O/P EST MOD 30 MIN: CPT

## 2024-03-22 NOTE — PLAN
[FreeTextEntry1] : We discusse.  We spent 35 minutes in consultation.  d the options of observation, D&C with suction, or medical treatment for the miscarriage with misoprostol.  Patient just wants to observe and see how it goes.  She will come back in 2 weeks for reevaluation

## 2024-03-22 NOTE — HISTORY OF PRESENT ILLNESS
[FreeTextEntry1] : This 38-year-old -0-3-1 presents to follow-up after an ultrasound done to evaluate her intrauterine pregnancy which was running size behind dates.  She should be 8 weeks and 2 days now but the ultrasound revealed a 6-week and 2-day embryo with no fetal heartbeat.  She had a scan 8 days ago which also showed a 6-week embryo with the beginnings of a heartbeat.  This findings are consistent with missed AB. tvus cw missed ab had pneumonia, needed ct and cxrs at 6 weeks form lmp, was ro pe..   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  She said that she and her  have decided they will keep trying and so she does not need to know why she been having these miscarriages.  In light of her recent severe pneumonia and exposure to a CAT scan with contrast at May have been a contributing factor to this miscarriage.  6 weeks from her last menstrual period

## 2024-03-28 ENCOUNTER — EMERGENCY (EMERGENCY)
Facility: HOSPITAL | Age: 39
LOS: 1 days | Discharge: HOME | End: 2024-03-28
Attending: EMERGENCY MEDICINE
Payer: COMMERCIAL

## 2024-03-28 ENCOUNTER — NON-APPOINTMENT (OUTPATIENT)
Age: 39
End: 2024-03-28

## 2024-03-28 VITALS
DIASTOLIC BLOOD PRESSURE: 61 MMHG | RESPIRATION RATE: 18 BRPM | HEART RATE: 89 BPM | SYSTOLIC BLOOD PRESSURE: 98 MMHG | OXYGEN SATURATION: 99 %

## 2024-03-28 VITALS — WEIGHT: 134.92 LBS | HEIGHT: 70 IN

## 2024-03-28 DIAGNOSIS — K08.409 PARTIAL LOSS OF TEETH, UNSPECIFIED CAUSE, UNSPECIFIED CLASS: Chronic | ICD-10-CM

## 2024-03-28 DIAGNOSIS — Z98.890 OTHER SPECIFIED POSTPROCEDURAL STATES: Chronic | ICD-10-CM

## 2024-03-28 LAB
ALBUMIN SERPL ELPH-MCNC: 4.2 G/DL — SIGNIFICANT CHANGE UP (ref 3.3–5.2)
ALP SERPL-CCNC: 64 U/L — SIGNIFICANT CHANGE UP (ref 40–120)
ALT FLD-CCNC: 10 U/L — SIGNIFICANT CHANGE UP
ANION GAP SERPL CALC-SCNC: 15 MMOL/L — SIGNIFICANT CHANGE UP (ref 5–17)
APTT BLD: 30.9 SEC — SIGNIFICANT CHANGE UP (ref 24.5–35.6)
AST SERPL-CCNC: 19 U/L — SIGNIFICANT CHANGE UP
BASOPHILS # BLD AUTO: 0.07 K/UL — SIGNIFICANT CHANGE UP (ref 0–0.2)
BASOPHILS # BLD AUTO: 0.07 K/UL — SIGNIFICANT CHANGE UP (ref 0–0.2)
BASOPHILS NFR BLD AUTO: 0.5 % — SIGNIFICANT CHANGE UP (ref 0–2)
BASOPHILS NFR BLD AUTO: 0.7 % — SIGNIFICANT CHANGE UP (ref 0–2)
BILIRUB SERPL-MCNC: 0.4 MG/DL — SIGNIFICANT CHANGE UP (ref 0.4–2)
BLD GP AB SCN SERPL QL: SIGNIFICANT CHANGE UP
BUN SERPL-MCNC: 11.4 MG/DL — SIGNIFICANT CHANGE UP (ref 8–20)
CALCIUM SERPL-MCNC: 9.2 MG/DL — SIGNIFICANT CHANGE UP (ref 8.4–10.5)
CHLORIDE SERPL-SCNC: 101 MMOL/L — SIGNIFICANT CHANGE UP (ref 96–108)
CO2 SERPL-SCNC: 22 MMOL/L — SIGNIFICANT CHANGE UP (ref 22–29)
CREAT SERPL-MCNC: 0.57 MG/DL — SIGNIFICANT CHANGE UP (ref 0.5–1.3)
EGFR: 118 ML/MIN/1.73M2 — SIGNIFICANT CHANGE UP
EOSINOPHIL # BLD AUTO: 0 K/UL — SIGNIFICANT CHANGE UP (ref 0–0.5)
EOSINOPHIL # BLD AUTO: 0.13 K/UL — SIGNIFICANT CHANGE UP (ref 0–0.5)
EOSINOPHIL NFR BLD AUTO: 0 % — SIGNIFICANT CHANGE UP (ref 0–6)
EOSINOPHIL NFR BLD AUTO: 1.4 % — SIGNIFICANT CHANGE UP (ref 0–6)
GLUCOSE SERPL-MCNC: 104 MG/DL — HIGH (ref 70–99)
HCG SERPL-ACNC: 3916 MIU/ML — HIGH
HCT VFR BLD CALC: 32.6 % — LOW (ref 34.5–45)
HCT VFR BLD CALC: 39.2 % — SIGNIFICANT CHANGE UP (ref 34.5–45)
HGB BLD-MCNC: 11 G/DL — LOW (ref 11.5–15.5)
HGB BLD-MCNC: 13 G/DL — SIGNIFICANT CHANGE UP (ref 11.5–15.5)
IMM GRANULOCYTES NFR BLD AUTO: 0.3 % — SIGNIFICANT CHANGE UP (ref 0–0.9)
IMM GRANULOCYTES NFR BLD AUTO: 0.5 % — SIGNIFICANT CHANGE UP (ref 0–0.9)
INR BLD: 1.08 RATIO — SIGNIFICANT CHANGE UP (ref 0.85–1.18)
LYMPHOCYTES # BLD AUTO: 1.58 K/UL — SIGNIFICANT CHANGE UP (ref 1–3.3)
LYMPHOCYTES # BLD AUTO: 12.2 % — LOW (ref 13–44)
LYMPHOCYTES # BLD AUTO: 3.27 K/UL — SIGNIFICANT CHANGE UP (ref 1–3.3)
LYMPHOCYTES # BLD AUTO: 34.2 % — SIGNIFICANT CHANGE UP (ref 13–44)
MCHC RBC-ENTMCNC: 30.9 PG — SIGNIFICANT CHANGE UP (ref 27–34)
MCHC RBC-ENTMCNC: 31 PG — SIGNIFICANT CHANGE UP (ref 27–34)
MCHC RBC-ENTMCNC: 33.2 GM/DL — SIGNIFICANT CHANGE UP (ref 32–36)
MCHC RBC-ENTMCNC: 33.7 GM/DL — SIGNIFICANT CHANGE UP (ref 32–36)
MCV RBC AUTO: 91.8 FL — SIGNIFICANT CHANGE UP (ref 80–100)
MCV RBC AUTO: 93.1 FL — SIGNIFICANT CHANGE UP (ref 80–100)
MONOCYTES # BLD AUTO: 0.27 K/UL — SIGNIFICANT CHANGE UP (ref 0–0.9)
MONOCYTES # BLD AUTO: 0.63 K/UL — SIGNIFICANT CHANGE UP (ref 0–0.9)
MONOCYTES NFR BLD AUTO: 2.1 % — SIGNIFICANT CHANGE UP (ref 2–14)
MONOCYTES NFR BLD AUTO: 6.6 % — SIGNIFICANT CHANGE UP (ref 2–14)
NEUTROPHILS # BLD AUTO: 10.94 K/UL — HIGH (ref 1.8–7.4)
NEUTROPHILS # BLD AUTO: 5.44 K/UL — SIGNIFICANT CHANGE UP (ref 1.8–7.4)
NEUTROPHILS NFR BLD AUTO: 56.8 % — SIGNIFICANT CHANGE UP (ref 43–77)
NEUTROPHILS NFR BLD AUTO: 84.7 % — HIGH (ref 43–77)
PLATELET # BLD AUTO: 204 K/UL — SIGNIFICANT CHANGE UP (ref 150–400)
PLATELET # BLD AUTO: 246 K/UL — SIGNIFICANT CHANGE UP (ref 150–400)
POTASSIUM SERPL-MCNC: 3.4 MMOL/L — LOW (ref 3.5–5.3)
POTASSIUM SERPL-SCNC: 3.4 MMOL/L — LOW (ref 3.5–5.3)
PROT SERPL-MCNC: 7.3 G/DL — SIGNIFICANT CHANGE UP (ref 6.6–8.7)
PROTHROM AB SERPL-ACNC: 12 SEC — SIGNIFICANT CHANGE UP (ref 9.5–13)
RBC # BLD: 3.55 M/UL — LOW (ref 3.8–5.2)
RBC # BLD: 4.21 M/UL — SIGNIFICANT CHANGE UP (ref 3.8–5.2)
RBC # FLD: 12.8 % — SIGNIFICANT CHANGE UP (ref 10.3–14.5)
RBC # FLD: 12.8 % — SIGNIFICANT CHANGE UP (ref 10.3–14.5)
SODIUM SERPL-SCNC: 138 MMOL/L — SIGNIFICANT CHANGE UP (ref 135–145)
WBC # BLD: 12.93 K/UL — HIGH (ref 3.8–10.5)
WBC # BLD: 9.57 K/UL — SIGNIFICANT CHANGE UP (ref 3.8–10.5)
WBC # FLD AUTO: 12.93 K/UL — HIGH (ref 3.8–10.5)
WBC # FLD AUTO: 9.57 K/UL — SIGNIFICANT CHANGE UP (ref 3.8–10.5)

## 2024-03-28 PROCEDURE — 99291 CRITICAL CARE FIRST HOUR: CPT

## 2024-03-28 PROCEDURE — 80053 COMPREHEN METABOLIC PANEL: CPT

## 2024-03-28 PROCEDURE — 36430 TRANSFUSION BLD/BLD COMPNT: CPT

## 2024-03-28 PROCEDURE — 76801 OB US < 14 WKS SINGLE FETUS: CPT

## 2024-03-28 PROCEDURE — P9040: CPT

## 2024-03-28 PROCEDURE — 99285 EMERGENCY DEPT VISIT HI MDM: CPT | Mod: 25

## 2024-03-28 PROCEDURE — 76817 TRANSVAGINAL US OBSTETRIC: CPT | Mod: 26

## 2024-03-28 PROCEDURE — 85610 PROTHROMBIN TIME: CPT

## 2024-03-28 PROCEDURE — 85730 THROMBOPLASTIN TIME PARTIAL: CPT

## 2024-03-28 PROCEDURE — 86923 COMPATIBILITY TEST ELECTRIC: CPT

## 2024-03-28 PROCEDURE — 36415 COLL VENOUS BLD VENIPUNCTURE: CPT

## 2024-03-28 PROCEDURE — 86850 RBC ANTIBODY SCREEN: CPT

## 2024-03-28 PROCEDURE — 85025 COMPLETE CBC W/AUTO DIFF WBC: CPT

## 2024-03-28 PROCEDURE — 76817 TRANSVAGINAL US OBSTETRIC: CPT

## 2024-03-28 PROCEDURE — 86901 BLOOD TYPING SEROLOGIC RH(D): CPT

## 2024-03-28 PROCEDURE — 76801 OB US < 14 WKS SINGLE FETUS: CPT | Mod: 26

## 2024-03-28 PROCEDURE — 86900 BLOOD TYPING SEROLOGIC ABO: CPT

## 2024-03-28 PROCEDURE — 84702 CHORIONIC GONADOTROPIN TEST: CPT

## 2024-03-28 RX ORDER — SODIUM CHLORIDE 9 MG/ML
1000 INJECTION INTRAMUSCULAR; INTRAVENOUS; SUBCUTANEOUS ONCE
Refills: 0 | Status: COMPLETED | OUTPATIENT
Start: 2024-03-28 | End: 2024-03-28

## 2024-03-28 RX ADMIN — SODIUM CHLORIDE 1000 MILLILITER(S): 9 INJECTION INTRAMUSCULAR; INTRAVENOUS; SUBCUTANEOUS at 14:09

## 2024-03-28 RX ADMIN — SODIUM CHLORIDE 1000 MILLILITER(S): 9 INJECTION INTRAMUSCULAR; INTRAVENOUS; SUBCUTANEOUS at 17:00

## 2024-03-28 NOTE — ED PROVIDER NOTE - OBJECTIVE STATEMENT
38yF  with pmh PE (not currently on AC) presenting with 1 day of vaginal bleeding.   Patient endorses  bleeding since this morning consisting of large clots.  She has bleeding with a frequency to the point that she is constantly in the bathroom and soaking through multiple pads with clots.   Patient reports that she has had 2 other miscarriages in the past and has required 1 D&C in 2023.  She is about 8 weeks presents.  She had an ultrasound done in the office within the last week that did not show a heartbeat.  She was instructed to come to the emergency department today for persistent bleeding.  She is not on anticoagulation and has no history of clotting disorders.  Her prior history of PE was provoked after hip surgery.  Of note patient presents to the emergency department a few weeks ago and had a chest x-ray and a CTA chest which was negative for PE but did show pneumonia for which she completed antibiotics.   Denies chest pain, shortness of breath, syncope but does endorse lightheadedness when standing.

## 2024-03-28 NOTE — ED PROVIDER NOTE - CLINICAL SUMMARY MEDICAL DECISION MAKING FREE TEXT BOX
38yF  with pmh PE (not currently on AC) presenting with 1 day of vaginal bleeding.  Patient's blood pressure stable.  Heart rates elevated 120.   Patient passing large blood clots since this morning suspicious for miscarriage.  Will check CBC, send type and screen, and obtain transvaginal ultrasound.

## 2024-03-28 NOTE — CONSULT NOTE ADULT - SUBJECTIVE AND OBJECTIVE BOX
HPI:     38y , Last Menstrual Period 2024, presents with severe vaginal bleeding that started this morning. Patient reports she was diagnosed with a missed AB a few days ago via sono that she was managing expectantly. This morning she started having severe bleeding and passage of clots the size of a lemon, preventing her from leaving the toilet because she was immediately saturating pads. When the patient initially arrived to the ED she says the bleeding was severe and it was accompanied with 10/10 abdominal cramps. Patient says bleeding and pain have improved and are now minimal for about an hour. She was initially feeling lightheaded and dizzy but says that has now resolved since receiving a blood transfusion while in the ED. Patient denies fevers, chills, sweats, cough, CP, SOB, abdominal pain, or urinary symptoms.     PMHX; Hx of provoked DVT after hip surgery  PSHX; Hip surgery due to fracture after a fall, D&C x1  POBHX; NSVDx1 uncomplicated (), sAB managed expectantly (), sAB s/p D&C ()  PGYNHX: denies hx of fibroids ovarian cysts, STIs, abnormal pap smears  SOCIAL: Denies current use of alcohol, tobacco, or illicit drugs  Allergies: No Known Allergies  MEDS: PNVs      Vital Signs Last 24 Hrs  T(C): 37.4 (28 Mar 2024 16:40), Max: 37.4 (28 Mar 2024 16:40)  T(F): 99.4 (28 Mar 2024 16:40), Max: 99.4 (28 Mar 2024 16:40)  HR: 108 (28 Mar 2024 16:40) (84 - 138)  BP: 106/58 (28 Mar 2024 16:40) (74/53 - 130/78)  BP(mean): 71 (28 Mar 2024 13:13) (71 - 71)  RR: 18 (28 Mar 2024 16:40) (18 - 20)  SpO2: 99% (28 Mar 2024 16:40) (99% - 100%)    Parameters below as of 28 Mar 2024 16:40  Patient On (Oxygen Delivery Method): room air      PHYSICAL EXAM:  Gen: Well-appearing, NAD, AAOx3  ABDOMEN: Soft, Nontender, Nondistended  EXTREMITIES: No clubbing, cyanosis, or edema  PELVIC:        EXTERNAL GENITALIA: Normal. No rashes or lesions noted.         VAGINA: large clot measuring about 2cm x3cm expelled from vagina, moderate amount of dark red blood in vagina, healthy pink mucosa, no gross lesions, no discharge noted         CERVIX: cervix appeared approximated 1cm dilated with products of conception at the cervical os which were removed with a ring forcep. No active bleeding. No CMt noted.        UTERUS: 7week size uterus, nontender, mobile        ADNEXA: no adnexal masses or tenderness appreciated.    LABS:                        13.0   9.57  )-----------( 246      ( 28 Mar 2024 12:20 )             39.2         138  |  101  |  11.4  ----------------------------<  104<H>  3.4<L>   |  22.0  |  0.57    Ca    9.2      28 Mar 2024 12:20    TPro  7.3  /  Alb  4.2  /  TBili  0.4  /  DBili  x   /  AST  19  /  ALT  10  /  AlkPhos  64      Urinalysis Basic - ( 28 Mar 2024 12:20 )    Color: x / Appearance: x / SG: x / pH: x  Gluc: 104 mg/dL / Ketone: x  / Bili: x / Urobili: x   Blood: x / Protein: x / Nitrite: x   Leuk Esterase: x / RBC: x / WBC x   Sq Epi: x / Non Sq Epi: x / Bacteria: x

## 2024-03-28 NOTE — CONSULT NOTE ADULT - ATTENDING COMMENTS
I saw pt in ED and agree with Dr. Alatorre's assessment and plan.  Pt is a 38y , Last Menstrual Period 2024, presents with severe vaginal bleeding and abdominal cramping in the setting of a missed AB  Pt's bleeding is significantly less.    On exam, very scant blood noted on her pad.  Pt states her cramping has completely resolved.  Her repeat h/h post transfusion (after 1 unit pRBC) was 11/32.6   TVUS showed normal size uterus with 1.4 cm lining and no evidence of intrauterine gestational sac.  Pt is hemodynamically stable and feeling much better with only scant bleeding and complete resolution of her abdominal cramping.  Likely completing AB.  Pt is Rh negative (O neg) will get rhogam prior to discharge  Pt advised to follow up with Dr De Jesus in office.  She already scheduled appt for next Friday.  Bleeding precautions discussed and all questions answered.    DEIRDRE Saravia (OB hospitalist)

## 2024-03-28 NOTE — ED PROVIDER NOTE - NSICDXPASTSURGICALHX_GEN_ALL_CORE_FT
PAST SURGICAL HISTORY:  H/O wisdom tooth extraction     S/P ORIF (open reduction internal fixation) fracture

## 2024-03-28 NOTE — ED ADULT NURSE NOTE - OBJECTIVE STATEMENT
pt comes to ED with reports of vaginal bleeding that began today, significant amounts with large clots as well. pt reporting dizziness and abdominal cramping.

## 2024-03-28 NOTE — ED PROVIDER NOTE - PROGRESS NOTE DETAILS
, attending: patient still had large amount of bleeding, became hypotension. 2 U PRBC ordered. unstable to go to US at this point. , attending: Patient report feeling better, getting transfusion. will go for US after blood is done. Yordan: pt improved, bleeding stopped, pain cotnrolled, tolerated rhogam, cleared by gyn, stable for dc

## 2024-03-28 NOTE — ED PROVIDER NOTE - ATTENDING CONTRIBUTION TO CARE
Upon my evaluation, this patient had a high probability of imminent or life-threatening deterioration due to vaginal hemorrhage secondary to miscarriage, which required my direct attention, intervention, and personal management.    38yF , Approximately 9 weeks pregnant presents with miscarriage.  Patient report having ultrasound performed that showed no fetal heart rate.  Patient started having bleeding this morning.  She had 3 prior miscarriage prior to this.  She had prior history of PE secondary to hip surgery.  Not on anticoagulation currently.   Hemoglobin 13.  However patient had multiple large vaginal bleed in the ED.  2 unit PRBC ordered.  Will need pelvic ultrasound and GYN consult.    I have personally provided _40_ minutes of critical care time exculsive of time spent on separately billable procedures.  Time includes review of laboratory data, radiology results, discussion with consultants, and monitoring for potential decompensation.  Interventions were performed as documented.

## 2024-03-28 NOTE — ED PROVIDER NOTE - PATIENT PORTAL LINK FT
You can access the FollowMyHealth Patient Portal offered by [facility] by registering at the following website: [email or web address]. By joining [email or web address]’s FollowMyHealth portal, you will also be able to view your health information using other applications (apps) compatible with our system.

## 2024-03-28 NOTE — ED ADULT NURSE NOTE - CHIEF COMPLAINT QUOTE
pt 8 weeks pregnant told she was having miscarriage a few days ago, today pt passing clots and large amount of blood vaginally. brought to [facility]

## 2024-03-28 NOTE — CONSULT NOTE ADULT - ASSESSMENT
38y , Last Menstrual Period 2024, presents with severe vaginal bleeding and abdominal cramping in the setting of a missed AB.    A/P:  -BPs labile. Upon initial presentation BP was 130/78. BP then decreased to 74/53. Patient was given IVF and 1U RBC. BPs 106/58. Now improved at 106/58. Mildly tachycardic at 108bpm.  -Patient reports she is no longer feeling lightheaded and dizzy. Bleeding and abdominal pain has improved.  -On physical exam products of conception were visible at the cervical os and removed with ring forceps. No active bleeding.  -Hgb 12.9. Patient received 1U RBC due to active bleeding and symptoms upon arrival. Pending repeat Hgb.  -T+S O neg. Will order Rhogam.  -Pending TVUS.  -Will re-evaluate after TVUS.    Discussed with Dr. Thomas.

## 2024-03-28 NOTE — ED ADULT NURSE REASSESSMENT NOTE - NS ED NURSE REASSESS COMMENT FT1
Patient received from CC RN @ this time. Vitals take, patient ambulated independently with steady gait. Care transferred with report given to jennifer SHRESTHA. Pt pending Rh ig. Blood bank contacted for pick. RhIG to be availble for pickup shortly - green RN made aware.     Assessment: A&Ox4, HEENT clear, LS equal clear bilat, denies SOB, pt on room air, denies CP, states mild abd cramping, pt states significantly lighter vaginal bleeding @ this time. Pt remains on cardiac monitor. IV site assessed - dry intact transparent, flushing well, good blood return, no abnormalities noted.    Patient to be given RhIG and DC.
pt brought back from sono @ this time, awake and alert, even and unlabored resps present. reporting she stopped bleeding and feels better. AOX3. even and unlabored resps present.

## 2024-03-28 NOTE — ED PROVIDER NOTE - PHYSICAL EXAMINATION
Gen: no acute distress  Head: normocephalic, atraumatic  EENT: EOMI, no conjunctival pallor  Lung: no increased work of breathing, CTABL  CV: normal s1/s2  Abd: soft, non-tender, non-distended  MSK: no visible deformities, full range of motion in all 4 extremities  Neuro: A&Ox4; No focal neurologic deficits  : large clots on external exam

## 2024-04-04 ENCOUNTER — APPOINTMENT (OUTPATIENT)
Dept: OBGYN | Facility: CLINIC | Age: 39
End: 2024-04-04
Payer: COMMERCIAL

## 2024-04-04 VITALS
SYSTOLIC BLOOD PRESSURE: 118 MMHG | HEIGHT: 70 IN | DIASTOLIC BLOOD PRESSURE: 80 MMHG | WEIGHT: 136 LBS | BODY MASS INDEX: 19.47 KG/M2

## 2024-04-04 PROCEDURE — 99214 OFFICE O/P EST MOD 30 MIN: CPT

## 2024-04-04 NOTE — PLAN
[FreeTextEntry1] : I discussed the clinical situation with her.  I am sending her for a serum hCG and patient will return in 2 weeks for sonogram.  I have advised her to abstain from unprotected intercourse till then.  Regarding birth control we had a discussion.  If she does not wish to try we discussed considering an IUD or permanent methods such as vasectomy or tubal ligation.  Patient will consider her options.

## 2024-04-04 NOTE — PHYSICAL EXAM
[Labia Majora] : normal [Labia Minora] : normal [Normal] : normal [Uterine Adnexae] : normal [FreeTextEntry4] : scant old blood

## 2024-04-04 NOTE — HISTORY OF PRESENT ILLNESS
[FreeTextEntry1] : 38-year-old white female G4 para 1-0-3-1 here for follow-up visit.  Patient had spontaneous AB and was seen in the emergency room 1 week ago with heavy vaginal bleeding.  She was hypotensive and transfused 1 unit.  Bleeding then abated and she was sent home.  Patient reports scant vaginal bleeding since then.  She thinks she is done with having children.  She has had 3 consecutive miscarriages.  She denies any pelvic pain.

## 2024-04-21 LAB — HCG SERPL-MCNC: 6 MIU/ML

## 2024-04-28 LAB — HCG SERPL-MCNC: 2 MIU/ML

## 2024-05-02 ENCOUNTER — ASOB RESULT (OUTPATIENT)
Age: 39
End: 2024-05-02

## 2024-05-02 ENCOUNTER — APPOINTMENT (OUTPATIENT)
Dept: ANTEPARTUM | Facility: CLINIC | Age: 39
End: 2024-05-02
Payer: COMMERCIAL

## 2024-05-02 ENCOUNTER — APPOINTMENT (OUTPATIENT)
Dept: OBGYN | Facility: CLINIC | Age: 39
End: 2024-05-02
Payer: COMMERCIAL

## 2024-05-02 DIAGNOSIS — Z30.09 ENCOUNTER FOR OTHER GENERAL COUNSELING AND ADVICE ON CONTRACEPTION: ICD-10-CM

## 2024-05-02 DIAGNOSIS — M81.0 AGE-RELATED OSTEOPOROSIS W/OUT CURRENT PATHOLOGICAL FRACTURE: ICD-10-CM

## 2024-05-02 DIAGNOSIS — O03.9 COMPLETE OR UNSPECIFIED SPONTANEOUS ABORTION W/OUT COMPLICATION: ICD-10-CM

## 2024-05-02 PROCEDURE — 76817 TRANSVAGINAL US OBSTETRIC: CPT

## 2024-05-02 PROCEDURE — 99214 OFFICE O/P EST MOD 30 MIN: CPT

## 2024-05-02 NOTE — DISCUSSION/SUMMARY
[FreeTextEntry1] : I reassured her regarding findings of the sonogram as well as her beta hCG.  Patient still has not got her.  And she will continue to monitor this.  Regarding her osteoporosis and birth control we had a discussion.  Patient is sure she is done with having children.  I discussed her options including hormonal contraception, IUD versus permanent methods for her or her .  Patient is interested in the IUD and pros and cons of different IUDs were discussed including hormonal and nonhormonal ones.  She denies history of heavy menses.  Regarding her osteoporosis I did discuss that she may ask her rheumatologist if she would benefit from oral contraceptives.  Patient will advise me on how she wishes to proceed.  Prescription for mammogram was also provided now.

## 2024-05-02 NOTE — HISTORY OF PRESENT ILLNESS
[FreeTextEntry1] : 38-year-old white female  4 para 1-0-3-1 presents for follow-up visit.  Patient had spontaneous AB and had heavy vaginal bleeding and went to the emergency room 4 weeks ago.  Since then she has had some bleeding but she says it has now resolved.  hCG came back negative.  She presents today for ultrasound and discussion as well as with other questions.  Patient also has a history of significant osteoporosis and had a spontaneous hip fracture.  She was seen by specialist at [facility] and advised to go on Evenity.  Patient thinks she is done with having children and she was advised that she should not get pregnant on this.  She wishes to discuss birth control options.  Ultrasound today reveals a 6.6 x 4.5 cm uterus with normal adnexa and no evidence of retained products.

## 2024-05-10 ENCOUNTER — APPOINTMENT (OUTPATIENT)
Dept: PULMONOLOGY | Facility: CLINIC | Age: 39
End: 2024-05-10

## 2024-08-15 ENCOUNTER — APPOINTMENT (OUTPATIENT)
Dept: OBGYN | Facility: CLINIC | Age: 39
End: 2024-08-15
Payer: COMMERCIAL

## 2024-08-15 VITALS
HEIGHT: 70 IN | DIASTOLIC BLOOD PRESSURE: 80 MMHG | BODY MASS INDEX: 19.33 KG/M2 | SYSTOLIC BLOOD PRESSURE: 123 MMHG | WEIGHT: 135 LBS

## 2024-08-15 DIAGNOSIS — Z30.09 ENCOUNTER FOR OTHER GENERAL COUNSELING AND ADVICE ON CONTRACEPTION: ICD-10-CM

## 2024-08-15 DIAGNOSIS — Z01.419 ENCOUNTER FOR GYNECOLOGICAL EXAMINATION (GENERAL) (ROUTINE) W/OUT ABNORMAL FINDINGS: ICD-10-CM

## 2024-08-15 PROCEDURE — 99396 PREV VISIT EST AGE 40-64: CPT

## 2024-08-15 PROCEDURE — 99459 PELVIC EXAMINATION: CPT

## 2024-08-15 NOTE — PHYSICAL EXAM
[Chaperone Present] : A chaperone was present in the examining room during all aspects of the physical examination [16081] : A chaperone was present during the pelvic exam. [FreeTextEntry2] : pily baig [Appropriately responsive] : appropriately responsive [Alert] : alert [No Acute Distress] : no acute distress [No Lymphadenopathy] : no lymphadenopathy [Regular Rate Rhythm] : regular rate rhythm [No Murmurs] : no murmurs [Clear to Auscultation B/L] : clear to auscultation bilaterally [Soft] : soft [Non-tender] : non-tender [Non-distended] : non-distended [No HSM] : No HSM [No Lesions] : no lesions [No Mass] : no mass [Oriented x3] : oriented x3 [Examination Of The Breasts] : a normal appearance [No Masses] : no breast masses were palpable [Labia Majora] : normal [Labia Minora] : normal [Normal] : normal [Uterine Adnexae] : normal

## 2024-08-15 NOTE — DISCUSSION/SUMMARY
[FreeTextEntry1] : Pap smear is performed.  Prescription for mammogram was provided.  I again discussed her birth control options.  She reports regular menses and is leaning towards the nonhormonal IUD.  Pros and cons were discussed of different IUDs and patient will come in with her next period.  All questions were answered.

## 2024-08-15 NOTE — HISTORY OF PRESENT ILLNESS
[FreeTextEntry1] : 39-year-old white female  4 para 1-0-3-1 here for annual visit.  Patient has had 3 miscarriages and 1 full-term delivery.  She is done with having children.  We had discussed considering an IUD and patient is still considering this.  She has a medical history of significant osteoporosis and spontaneous hip fracture and the specialist had recommended Evenity but patient has not started anything at the moment.  No other significant medical history.  Surgical history of some D&Cs and some screws in her hip.  OB history significant for 1 vaginal delivery and 3 miscarriages.  She is .  She denies tobacco or drug use but drinks occasionally.  She has a grandmother with breast cancer late in life but really no other family history of breast, ovary, colon, uterine CA.  Patient has never had a mammogram.

## 2024-08-17 LAB
CYTOLOGY CVX/VAG DOC THIN PREP: NORMAL
CYTOLOGY CVX/VAG DOC THIN PREP: NORMAL
HPV HIGH+LOW RISK DNA PNL CVX: NOT DETECTED

## 2024-08-25 LAB — CYTOLOGY CVX/VAG DOC THIN PREP: NORMAL

## 2024-09-12 ENCOUNTER — APPOINTMENT (OUTPATIENT)
Age: 39
End: 2024-09-12
Payer: COMMERCIAL

## 2024-09-12 VITALS
DIASTOLIC BLOOD PRESSURE: 75 MMHG | BODY MASS INDEX: 19.33 KG/M2 | WEIGHT: 135 LBS | HEIGHT: 70 IN | SYSTOLIC BLOOD PRESSURE: 113 MMHG

## 2024-09-12 DIAGNOSIS — Z97.5 PRESENCE OF (INTRAUTERINE) CONTRACEPTIVE DEVICE: ICD-10-CM

## 2024-09-12 PROCEDURE — 81025 URINE PREGNANCY TEST: CPT

## 2024-09-12 PROCEDURE — 58300 INSERT INTRAUTERINE DEVICE: CPT

## 2024-09-16 NOTE — ED STATDOCS - NSTIMEPROVIDERCAREINITIATE_GEN_ER
Problem: Nutrition/Hydration-ADULT  Goal: Nutrient/Hydration intake appropriate for improving, restoring or maintaining nutritional needs  Description: Monitor and assess patient's nutrition/hydration status for malnutrition. Collaborate with interdisciplinary team and initiate plan and interventions as ordered.  Monitor patient's weight and dietary intake as ordered or per policy. Utilize nutrition screening tool and intervene as necessary. Determine patient's food preferences and provide high-protein, high-caloric foods as appropriate.     INTERVENTIONS:  - Monitor oral intake, urinary output, labs, and treatment plans  - Assess nutrition and hydration status and recommend course of action  - Evaluate amount of meals eaten  - Assist patient with eating if necessary   - Allow adequate time for meals  - Recommend/ encourage appropriate diets, oral nutritional supplements, and vitamin/mineral supplements  - Order, calculate, and assess calorie counts as needed  - Recommend, monitor, and adjust tube feedings and TPN/PPN based on assessed needs  - Assess need for intravenous fluids  - Provide specific nutrition/hydration education as appropriate  - Include patient/family/caregiver in decisions related to nutrition  Outcome: Progressing     Problem: Prexisting or High Potential for Compromised Skin Integrity  Goal: Skin integrity is maintained or improved  Description: INTERVENTIONS:  - Identify patients at risk for skin breakdown  - Assess and monitor skin integrity  - Assess and monitor nutrition and hydration status  - Monitor labs   - Assess for incontinence   - Turn and reposition patient  - Assist with mobility/ambulation  - Relieve pressure over bony prominences  - Avoid friction and shearing  - Provide appropriate hygiene as needed including keeping skin clean and dry  - Evaluate need for skin moisturizer/barrier cream  - Collaborate with interdisciplinary team   - Patient/family teaching  - Consider wound  06-Mar-2024 13:31 care consult   Outcome: Progressing     Problem: Potential for Falls  Goal: Patient will remain free of falls  Description: INTERVENTIONS:  - Educate patient/family on patient safety including physical limitations  - Instruct patient to call for assistance with activity   - Consult OT/PT to assist with strengthening/mobility   - Keep Call bell within reach  - Keep bed low and locked with side rails adjusted as appropriate  - Keep care items and personal belongings within reach  - Initiate and maintain comfort rounds  - Make Fall Risk Sign visible to staff  - Offer Toileting every  Hours, in advance of need  - Initiate/Maintain alarm  - Obtain necessary fall risk management equipment:  - Apply yellow socks and bracelet for high fall risk patients  - Consider moving patient to room near nurses station  Outcome: Progressing     Problem: GASTROINTESTINAL - ADULT  Goal: Minimal or absence of nausea and/or vomiting  Description: INTERVENTIONS:  - Administer IV fluids if ordered to ensure adequate hydration  - Maintain NPO status until nausea and vomiting are resolved  - Nasogastric tube if ordered  - Administer ordered antiemetic medications as needed  - Provide nonpharmacologic comfort measures as appropriate  - Advance diet as tolerated, if ordered  - Consider nutrition services referral to assist patient with adequate nutrition and appropriate food choices  Outcome: Progressing  Goal: Maintains or returns to baseline bowel function  Description: INTERVENTIONS:  - Assess bowel function  - Encourage oral fluids to ensure adequate hydration  - Administer IV fluids if ordered to ensure adequate hydration  - Administer ordered medications as needed  - Encourage mobilization and activity  - Consider nutritional services referral to assist patient with adequate nutrition and appropriate food choices  Outcome: Progressing  Goal: Maintains adequate nutritional intake  Description: INTERVENTIONS:  - Monitor percentage of each  meal consumed  - Identify factors contributing to decreased intake, treat as appropriate  - Assist with meals as needed  - Monitor I&O, weight, and lab values if indicated  - Obtain nutrition services referral as needed  Outcome: Progressing  Goal: Oral mucous membranes remain intact  Description: INTERVENTIONS  - Assess oral mucosa and hygiene practices  - Implement preventative oral hygiene regimen  - Implement oral medicated treatments as ordered  - Initiate Nutrition services referral as needed  Outcome: Progressing

## 2024-09-17 ENCOUNTER — ASOB RESULT (OUTPATIENT)
Age: 39
End: 2024-09-17

## 2024-09-17 ENCOUNTER — APPOINTMENT (OUTPATIENT)
Age: 39
End: 2024-09-17
Payer: COMMERCIAL

## 2024-09-17 ENCOUNTER — APPOINTMENT (OUTPATIENT)
Dept: ANTEPARTUM | Facility: CLINIC | Age: 39
End: 2024-09-17
Payer: COMMERCIAL

## 2024-09-17 VITALS
HEIGHT: 70 IN | BODY MASS INDEX: 19.33 KG/M2 | SYSTOLIC BLOOD PRESSURE: 122 MMHG | WEIGHT: 135 LBS | DIASTOLIC BLOOD PRESSURE: 78 MMHG

## 2024-09-17 DIAGNOSIS — R10.2 PELVIC AND PERINEAL PAIN: ICD-10-CM

## 2024-09-17 PROCEDURE — 76856 US EXAM PELVIC COMPLETE: CPT | Mod: 59

## 2024-09-17 PROCEDURE — 99214 OFFICE O/P EST MOD 30 MIN: CPT

## 2024-09-17 PROCEDURE — 76830 TRANSVAGINAL US NON-OB: CPT

## 2024-09-17 RX ORDER — IBUPROFEN 600 MG/1
600 TABLET, FILM COATED ORAL EVERY 6 HOURS
Qty: 40 | Refills: 0 | Status: ACTIVE | COMMUNITY
Start: 2024-09-17 | End: 1900-01-01

## 2024-09-17 RX ORDER — DOXYCYCLINE HYCLATE 100 MG/1
100 TABLET ORAL
Qty: 14 | Refills: 0 | Status: ACTIVE | COMMUNITY
Start: 2024-09-17 | End: 1900-01-01

## 2024-09-17 NOTE — DISCUSSION/SUMMARY
[FreeTextEntry1] : I discussed the clinical situation at length with the patient.  Ultrasound reveals normal IUD placement.  She really does not have significant uterine tenderness but patient is extremely upset and uncomfortable.  I discussed a trial of oral doxycycline for a week as well as a higher dose of ibuprofen to see if her symptoms improved.  Pt Considering removal of IUD.

## 2024-09-24 ENCOUNTER — APPOINTMENT (OUTPATIENT)
Age: 39
End: 2024-09-24
Payer: COMMERCIAL

## 2024-09-24 VITALS
HEART RATE: 83 BPM | HEIGHT: 70 IN | WEIGHT: 135 LBS | BODY MASS INDEX: 19.33 KG/M2 | SYSTOLIC BLOOD PRESSURE: 136 MMHG | DIASTOLIC BLOOD PRESSURE: 81 MMHG

## 2024-09-24 DIAGNOSIS — Z30.09 ENCOUNTER FOR OTHER GENERAL COUNSELING AND ADVICE ON CONTRACEPTION: ICD-10-CM

## 2024-09-24 DIAGNOSIS — Z30.432 ENCOUNTER FOR REMOVAL OF INTRAUTERINE CONTRACEPTIVE DEVICE: ICD-10-CM

## 2024-09-24 PROCEDURE — 99214 OFFICE O/P EST MOD 30 MIN: CPT | Mod: 25

## 2024-09-24 PROCEDURE — 58301 REMOVE INTRAUTERINE DEVICE: CPT

## 2024-09-24 NOTE — DISCUSSION/SUMMARY
[FreeTextEntry1] : IUD was removed today without complications.  I had an extensive discussion with the patient regarding her birth control options.  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  Additionally she has had several miscarriages and she is absolutely done with having children.  I discussed other long-acting methods or permanent methods such as tubal ligation and vasectomy.  Pros and cons of tubal ligation were discussed including risk of bleeding infection pain etc.  Patient will consider her options and advise us.

## 2024-09-24 NOTE — PROCEDURE
[IUD Removal] : intrauterine device (IUD) removal [Dysmenorrhea] : dysmenorrhea [Risks] : risks [Benefits] : benefits [Patient] : patient [Speculum Placed] : speculum placed [Strings Visualized] : strings visualized [IUD Discarded] : IUD discarded [Tolerated Well] : Patient tolerated the procedure well

## 2024-09-24 NOTE — HISTORY OF PRESENT ILLNESS
[FreeTextEntry1] : 39-year-old white female para 1 here for follow-up visit.  Patient had Mirena IUD placement on 9/12/2024 and has reported persistent lower abdominal discomfort.  She has had ultrasound that reveals normal placement.  Patient wishes to have the IUD removed.

## 2024-10-30 ENCOUNTER — APPOINTMENT (OUTPATIENT)
Dept: ANTEPARTUM | Facility: CLINIC | Age: 39
End: 2024-10-30

## 2024-10-30 ENCOUNTER — APPOINTMENT (OUTPATIENT)
Dept: OBGYN | Facility: CLINIC | Age: 39
End: 2024-10-30

## 2024-11-01 ENCOUNTER — RESULT REVIEW (OUTPATIENT)
Age: 39
End: 2024-11-01

## 2024-11-01 ENCOUNTER — NON-APPOINTMENT (OUTPATIENT)
Age: 39
End: 2024-11-01

## 2024-11-08 ENCOUNTER — APPOINTMENT (OUTPATIENT)
Dept: ORTHOPEDIC SURGERY | Facility: CLINIC | Age: 39
End: 2024-11-08
Payer: COMMERCIAL

## 2024-11-08 VITALS
BODY MASS INDEX: 19.33 KG/M2 | WEIGHT: 135 LBS | HEIGHT: 70 IN | HEART RATE: 90 BPM | SYSTOLIC BLOOD PRESSURE: 113 MMHG | DIASTOLIC BLOOD PRESSURE: 76 MMHG

## 2024-11-08 DIAGNOSIS — M79.673 PAIN IN UNSPECIFIED FOOT: ICD-10-CM

## 2024-11-08 PROCEDURE — 99213 OFFICE O/P EST LOW 20 MIN: CPT

## 2024-11-15 ENCOUNTER — RESULT REVIEW (OUTPATIENT)
Age: 39
End: 2024-11-15

## 2024-11-15 ENCOUNTER — OUTPATIENT (OUTPATIENT)
Dept: OUTPATIENT SERVICES | Facility: HOSPITAL | Age: 39
LOS: 1 days | End: 2024-11-15
Payer: COMMERCIAL

## 2024-11-15 ENCOUNTER — APPOINTMENT (OUTPATIENT)
Dept: MAMMOGRAPHY | Facility: CLINIC | Age: 39
End: 2024-11-15
Payer: COMMERCIAL

## 2024-11-15 DIAGNOSIS — Z00.8 ENCOUNTER FOR OTHER GENERAL EXAMINATION: ICD-10-CM

## 2024-11-15 PROCEDURE — 76642 ULTRASOUND BREAST LIMITED: CPT | Mod: 26,LT

## 2024-11-15 PROCEDURE — 77066 DX MAMMO INCL CAD BI: CPT

## 2024-11-15 PROCEDURE — 76642 ULTRASOUND BREAST LIMITED: CPT

## 2024-11-15 PROCEDURE — 77066 DX MAMMO INCL CAD BI: CPT | Mod: 26

## 2024-11-15 PROCEDURE — G0279: CPT

## 2024-11-15 PROCEDURE — G0279: CPT | Mod: 26

## 2024-11-20 ENCOUNTER — APPOINTMENT (OUTPATIENT)
Dept: ORTHOPEDIC SURGERY | Facility: CLINIC | Age: 39
End: 2024-11-20
Payer: COMMERCIAL

## 2024-11-20 ENCOUNTER — OUTPATIENT (OUTPATIENT)
Dept: OUTPATIENT SERVICES | Facility: HOSPITAL | Age: 39
LOS: 1 days | End: 2024-11-20
Payer: COMMERCIAL

## 2024-11-20 ENCOUNTER — APPOINTMENT (OUTPATIENT)
Dept: MRI IMAGING | Facility: CLINIC | Age: 39
End: 2024-11-20
Payer: COMMERCIAL

## 2024-11-20 DIAGNOSIS — M79.673 PAIN IN UNSPECIFIED FOOT: ICD-10-CM

## 2024-11-20 DIAGNOSIS — Z00.8 ENCOUNTER FOR OTHER GENERAL EXAMINATION: ICD-10-CM

## 2024-11-20 DIAGNOSIS — Z98.890 OTHER SPECIFIED POSTPROCEDURAL STATES: Chronic | ICD-10-CM

## 2024-11-20 DIAGNOSIS — K08.409 PARTIAL LOSS OF TEETH, UNSPECIFIED CAUSE, UNSPECIFIED CLASS: Chronic | ICD-10-CM

## 2024-11-20 PROCEDURE — 99447 NTRPROF PH1/NTRNET/EHR 11-20: CPT

## 2024-11-20 PROCEDURE — 73718 MRI LOWER EXTREMITY W/O DYE: CPT

## 2024-11-20 PROCEDURE — 73718 MRI LOWER EXTREMITY W/O DYE: CPT | Mod: 26,RT

## 2024-11-22 ENCOUNTER — APPOINTMENT (OUTPATIENT)
Dept: ORTHOPEDIC SURGERY | Facility: CLINIC | Age: 39
End: 2024-11-22
Payer: COMMERCIAL

## 2024-11-22 VITALS
SYSTOLIC BLOOD PRESSURE: 112 MMHG | DIASTOLIC BLOOD PRESSURE: 75 MMHG | HEART RATE: 76 BPM | WEIGHT: 135 LBS | HEIGHT: 70 IN | BODY MASS INDEX: 19.33 KG/M2

## 2024-11-22 DIAGNOSIS — M84.374D STRESS FRACTURE, RIGHT FOOT, SUBSEQUENT ENCOUNTER FOR FRACTURE WITH ROUTINE HEALING: ICD-10-CM

## 2024-11-22 PROCEDURE — 99213 OFFICE O/P EST LOW 20 MIN: CPT | Mod: 25

## 2024-11-22 PROCEDURE — 28470 CLTX METATARSAL FX WO MNP EA: CPT

## 2024-12-11 NOTE — ASU PATIENT PROFILE, ADULT - PATIENT'S GENDER IDENTITY
Detail Level: Detailed Quality 226: Preventive Care And Screening: Tobacco Use: Screening And Cessation Intervention: Patient screened for tobacco use and is an ex/non-smoker Female

## 2024-12-16 ENCOUNTER — APPOINTMENT (OUTPATIENT)
Age: 39
End: 2024-12-16

## 2024-12-18 ENCOUNTER — APPOINTMENT (OUTPATIENT)
Dept: ORTHOPEDIC SURGERY | Facility: CLINIC | Age: 39
End: 2024-12-18
Payer: COMMERCIAL

## 2024-12-18 VITALS
SYSTOLIC BLOOD PRESSURE: 125 MMHG | HEIGHT: 70 IN | BODY MASS INDEX: 19.33 KG/M2 | DIASTOLIC BLOOD PRESSURE: 79 MMHG | HEART RATE: 74 BPM | WEIGHT: 135 LBS

## 2024-12-18 DIAGNOSIS — M84.374D STRESS FRACTURE, RIGHT FOOT, SUBSEQUENT ENCOUNTER FOR FRACTURE WITH ROUTINE HEALING: ICD-10-CM

## 2024-12-18 PROCEDURE — 99024 POSTOP FOLLOW-UP VISIT: CPT
